# Patient Record
Sex: FEMALE | Race: WHITE | NOT HISPANIC OR LATINO | Employment: STUDENT | URBAN - NONMETROPOLITAN AREA
[De-identification: names, ages, dates, MRNs, and addresses within clinical notes are randomized per-mention and may not be internally consistent; named-entity substitution may affect disease eponyms.]

---

## 2019-12-26 ENCOUNTER — OFFICE VISIT (OUTPATIENT)
Dept: URGENT CARE | Facility: PHYSICIAN GROUP | Age: 29
End: 2019-12-26
Payer: COMMERCIAL

## 2019-12-26 VITALS
DIASTOLIC BLOOD PRESSURE: 78 MMHG | HEIGHT: 65 IN | BODY MASS INDEX: 32.49 KG/M2 | HEART RATE: 100 BPM | WEIGHT: 195 LBS | TEMPERATURE: 97.7 F | SYSTOLIC BLOOD PRESSURE: 116 MMHG | OXYGEN SATURATION: 98 % | RESPIRATION RATE: 16 BRPM

## 2019-12-26 DIAGNOSIS — G43.909 MIGRAINE SYNDROME: ICD-10-CM

## 2019-12-26 DIAGNOSIS — R20.2 FACIAL PARESTHESIA: ICD-10-CM

## 2019-12-26 PROCEDURE — 99204 OFFICE O/P NEW MOD 45 MIN: CPT | Performed by: PHYSICIAN ASSISTANT

## 2019-12-26 RX ORDER — METHYLPREDNISOLONE 4 MG/1
4 TABLET ORAL SEE ADMIN INSTRUCTIONS
Qty: 21 TAB | Refills: 0 | Status: SHIPPED | OUTPATIENT
Start: 2019-12-26

## 2019-12-26 RX ORDER — SUMATRIPTAN 50 MG/1
50 TABLET, FILM COATED ORAL
Qty: 10 TAB | Refills: 0 | Status: SHIPPED | OUTPATIENT
Start: 2019-12-26

## 2019-12-26 NOTE — PROGRESS NOTES
Chief Complaint   Patient presents with   • Numbness     R side of face X 8 days       HISTORY OF PRESENT ILLNESS: Patient is a 29 y.o. female who presents today because she has reduced sensation to the right side of her face.  This been going on for 8 days.  She denies any trauma to the area, denies any headaches, denies any visual disturbances or changes.  She has not been taking any medications for her current symptoms.  She does have a history of migraines, has not had her migraine medication in quite some time, but this is not her typical symptomology.  Denies any drooping or inability to move her face, has had normal speech    There are no active problems to display for this patient.      Allergies:Patient has no known allergies.    Current Outpatient Medications Ordered in Epic   Medication Sig Dispense Refill   • SUMAtriptan (IMITREX) 50 MG Tab Take 1 Tab by mouth Once PRN for Migraine for up to 1 dose. 10 Tab 0   • methylPREDNISolone (MEDROL DOSEPAK) 4 MG Tablet Therapy Pack Take 1 Tab by mouth See Admin Instructions. 21 Tab 0     No current Epic-ordered facility-administered medications on file.        History reviewed. No pertinent past medical history.    Social History     Tobacco Use   • Smoking status: Current Every Day Smoker     Packs/day: 0.00     Types: Cigarettes   • Smokeless tobacco: Never Used   Substance Use Topics   • Alcohol use: Not on file   • Drug use: Not on file       No family status information on file.   History reviewed. No pertinent family history.    ROS:  Review of Systems   Constitutional: Negative for fever, chills, weight loss and malaise/fatigue.   HENT: Negative for ear pain, nosebleeds, congestion, sore throat and neck pain.    Eyes: Negative for blurred vision.   Respiratory: Negative for cough, sputum production, shortness of breath and wheezing.    Cardiovascular: Negative for chest pain, palpitations, orthopnea and leg swelling.   Gastrointestinal: Negative for  "heartburn, nausea, vomiting and abdominal pain.   Genitourinary: Negative for dysuria, urgency and frequency.     Exam:  /78 (BP Location: Right arm, Patient Position: Sitting, BP Cuff Size: Adult)   Pulse 100   Temp 36.5 °C (97.7 °F) (Temporal)   Resp 16   Ht 1.651 m (5' 5\")   Wt 88.5 kg (195 lb)   SpO2 98%   General:  Well nourished, well developed female in NAD  Head:Normocephalic, atraumatic  Eyes: PERRLA, EOM within normal limits, no conjunctival injection, no scleral icterus, visual fields and acuity grossly intact.  Ears: Normal shape and symmetry, no tenderness, no discharge. External canals are without any significant edema or erythema. Tympanic membranes are without any inflammation, no effusion. Gross auditory acuity is intact  Nose: Symmetrical without tenderness, no discharge.  Mouth: reasonable hygiene, no erythema exudates or tonsillar enlargement.  Neck: no masses, range of motion within normal limits, no tracheal deviation. No obvious thyroid enlargement.  Pulmonary: chest is symmetrical with respiration, no wheezes, crackles, or rhonchi.  Cardiovascular: regular rate and rhythm without murmurs, rubs, or gallops.  Extremities: no clubbing, cyanosis, or edema.  Neurologic: Alert and oriented x3, normal gait, normal upper and lower extremity strength, cranial nerves II through XII checked and are grossly intact with the exception of somewhat reduced sensation to the right side of her forehead, cheek and chin in a trigeminal distribution    Please note that this dictation was created using voice recognition software. I have made every reasonable attempt to correct obvious errors, but I expect that there are errors of grammar and possibly content that I did not discover before finalizing the note.    Assessment/Plan:  1. Migraine syndrome  SUMAtriptan (IMITREX) 50 MG Tab   2. Facial paresthesia  methylPREDNISolone (MEDROL DOSEPAK) 4 MG Tablet Therapy Pack   Unclear etiology but if symptoms " do not significantly improve on the above medications, recommended that she go to the ER.  Also go to the ER for any worsening of symptoms    Followup with primary care in the next 7-10 days if not significantly improving, return to the urgent care or go to the emergency room sooner for any worsening of symptoms.

## 2024-06-19 ENCOUNTER — OFFICE VISIT (OUTPATIENT)
Dept: MEDICAL GROUP | Facility: MEDICAL CENTER | Age: 34
End: 2024-06-19
Payer: COMMERCIAL

## 2024-06-19 ENCOUNTER — TELEPHONE (OUTPATIENT)
Dept: MEDICAL GROUP | Facility: MEDICAL CENTER | Age: 34
End: 2024-06-19

## 2024-06-19 VITALS
HEART RATE: 119 BPM | BODY MASS INDEX: 27.66 KG/M2 | HEIGHT: 65 IN | TEMPERATURE: 98.4 F | OXYGEN SATURATION: 97 % | WEIGHT: 166 LBS | DIASTOLIC BLOOD PRESSURE: 70 MMHG | SYSTOLIC BLOOD PRESSURE: 118 MMHG

## 2024-06-19 DIAGNOSIS — Z11.4 ENCOUNTER FOR SCREENING FOR HIV: ICD-10-CM

## 2024-06-19 DIAGNOSIS — F43.81 PROLONGED GRIEF DISORDER: ICD-10-CM

## 2024-06-19 DIAGNOSIS — E66.3 OVERWEIGHT (BMI 25.0-29.9): ICD-10-CM

## 2024-06-19 DIAGNOSIS — Z11.59 NEED FOR HEPATITIS C SCREENING TEST: ICD-10-CM

## 2024-06-19 DIAGNOSIS — F32.2 SEVERE MAJOR DEPRESSION (HCC): ICD-10-CM

## 2024-06-19 DIAGNOSIS — F11.90 OPIOID USE DISORDER: ICD-10-CM

## 2024-06-19 DIAGNOSIS — Z13.6 SCREENING FOR CARDIOVASCULAR CONDITION: ICD-10-CM

## 2024-06-19 PROCEDURE — 3078F DIAST BP <80 MM HG: CPT | Performed by: FAMILY MEDICINE

## 2024-06-19 PROCEDURE — 3074F SYST BP LT 130 MM HG: CPT | Performed by: FAMILY MEDICINE

## 2024-06-19 PROCEDURE — 99204 OFFICE O/P NEW MOD 45 MIN: CPT | Performed by: FAMILY MEDICINE

## 2024-06-19 RX ORDER — BUPRENORPHINE HYDROCHLORIDE AND NALOXONE HYDROCHLORIDE DIHYDRATE 8; 2 MG/1; MG/1
1 TABLET SUBLINGUAL 3 TIMES DAILY
Qty: 90 TABLET | Refills: 0 | Status: SHIPPED | OUTPATIENT
Start: 2024-06-19 | End: 2024-06-19

## 2024-06-19 RX ORDER — ARIPIPRAZOLE 5 MG/1
5 TABLET ORAL DAILY
COMMUNITY
End: 2024-06-19 | Stop reason: SDUPTHER

## 2024-06-19 RX ORDER — NALOXONE HYDROCHLORIDE 4 MG/.1ML
1 SPRAY NASAL PRN
COMMUNITY
Start: 2024-04-04 | End: 2024-06-19 | Stop reason: SDUPTHER

## 2024-06-19 RX ORDER — FLUOXETINE HYDROCHLORIDE 20 MG/1
20 CAPSULE ORAL DAILY
Qty: 90 CAPSULE | Refills: 3 | Status: SHIPPED | OUTPATIENT
Start: 2024-06-19

## 2024-06-19 RX ORDER — BUPRENORPHINE AND NALOXONE 8; 2 MG/1; MG/1
1 FILM, SOLUBLE BUCCAL; SUBLINGUAL 3 TIMES DAILY
Qty: 90 EACH | Refills: 0 | Status: SHIPPED | OUTPATIENT
Start: 2024-08-18 | End: 2024-06-19

## 2024-06-19 RX ORDER — ARIPIPRAZOLE 5 MG/1
5 TABLET ORAL DAILY
Qty: 90 TABLET | Refills: 3 | Status: SHIPPED | OUTPATIENT
Start: 2024-06-19

## 2024-06-19 RX ORDER — BUPRENORPHINE HYDROCHLORIDE AND NALOXONE HYDROCHLORIDE DIHYDRATE 8; 2 MG/1; MG/1
1 TABLET SUBLINGUAL 3 TIMES DAILY
COMMUNITY
End: 2024-06-19 | Stop reason: SDUPTHER

## 2024-06-19 RX ORDER — NALOXONE HYDROCHLORIDE 4 MG/.1ML
1 SPRAY NASAL PRN
Qty: 2 EACH | Refills: 3 | Status: SHIPPED | OUTPATIENT
Start: 2024-06-19

## 2024-06-19 RX ORDER — BUPRENORPHINE AND NALOXONE 8; 2 MG/1; MG/1
1 FILM, SOLUBLE BUCCAL; SUBLINGUAL 3 TIMES DAILY
Qty: 90 EACH | Refills: 0 | Status: SHIPPED | OUTPATIENT
Start: 2024-07-19 | End: 2024-06-19

## 2024-06-19 ASSESSMENT — PATIENT HEALTH QUESTIONNAIRE - PHQ9
5. POOR APPETITE OR OVEREATING: 3 - NEARLY EVERY DAY
CLINICAL INTERPRETATION OF PHQ2 SCORE: 6
SUM OF ALL RESPONSES TO PHQ QUESTIONS 1-9: 23

## 2024-06-19 NOTE — TELEPHONE ENCOUNTER
----- Message from Guanakito Birmingham D.O. sent at 6/19/2024  3:03 PM PDT -----  Regarding: Suboxone  Will you please call the pharmacy and tell them to cancel the Suboxone.  The other medications are fine.  Thank you

## 2024-06-19 NOTE — PROGRESS NOTES
Verbal consent was acquired by the patient to use JOVAN copilot ambient listening note generation during this visit    Subjective:     CC:  Diagnoses of Opioid use disorder, Severe major depression (HCC), Prolonged grief disorder, Screening for cardiovascular condition, Overweight (BMI 25.0-29.9), Need for hepatitis C screening test, and Encounter for screening for HIV were pertinent to this visit.    HISTORY OF THE PRESENT ILLNESS: Patient is a 33 y.o. female. This pleasant patient is here today to establish care and discuss location management.     History of Present Illness  The patient is a 33-year-old female who is coming in to establish care.    The patient recently returned home from inpatient treatment at [facility] in [place], where she was prescribed Prozac 20 mg, Abilify 5 mg, and Suboxone, administered thrice daily. She reports an improvement in her condition, however, she has been without Abilify for the past 2 weeks. She expresses feelings of malaise and expresses a desire to reside independently. Her outpatient treatment was initiated due to a heroin addiction. Her father, who was 63 years old, was diagnosed with lung cancer and passed away 3 weeks later. Six months later, her dog of 16 years passed away. Two weeks later, her maternal aunt passed away. She was diagnosed with ADHD and subsequently developed prolonged grief disorder. She resides in [facility] with her , and her mother who are aware of her heroin addiction. Her father was an alcoholic, but her  and mother do not have any addictions. She does not have any leftover pain medications or other stashes. The exit strategy from rehab was not ideal for her. She denies any self-harming thoughts, but expresses concern about potential harm to others who care for her as a manifestation of guilt. She was recommended for rehabilitation, but found it challenging to attend. She felt better on Prozac and Abilify, although she did not  consistently feel happy. She had a telehealth consultation with a psychologist who recommended primary care. She sees a psychologist weekly via telehealth he is based in [place]. She promises not to harm herself. She rarely takes Seroquel. She was prescribed Ativan during her detoxification period. She was also prescribed baclofen, which she did not take. She has Narcan at home. She does not have any minors in her home.    The patient requests her hormone levels to be checked. She has an intrauterine device (IUD) and is unsure of her menstrual cycle. Her last Pap smear was 7 years ago. She had blood work done during her inpatient treatment.   She went through a separation with her .   Her maternal aunt had rheumatoid arthritis and thyroid issues. Her mother does not have any medical problems.    Current Outpatient Medications Ordered in Epic   Medication Sig Dispense Refill    FLUoxetine (PROZAC) 20 MG Cap Take 1 Capsule by mouth every day. 90 Capsule 3    ARIPiprazole (ABILIFY) 5 MG tablet Take 1 Tablet by mouth every day. 90 Tablet 3    Naloxone (NARCAN) 4 MG/0.1ML Liquid Administer 4 mg into affected nostril(S) as needed (over dose). 2 Each 3     No current Epic-ordered facility-administered medications on file.       Allergies   Allergen Reactions    Tape     Vicodin [Hydrocodone-Acetaminophen]        Past Medical History:   Diagnosis Date    Chronic low back pain     Seizure disorder (HCC)        Past Surgical History:   Procedure Laterality Date    I&D  2/2009    Rt breast abcess       Family History   Problem Relation Age of Onset    Alcohol abuse Father     Cancer Father         lung cancer, smoker    Lung Disease Father     Thyroid Maternal Aunt        Social History     Socioeconomic History    Marital status:    Tobacco Use    Smoking status: Every Day     Types: Cigarettes    Smokeless tobacco: Never    Tobacco comments:     4/day   Substance and Sexual Activity    Alcohol use: No      "Comment: OCCAsIONALLY    Drug use: No   Social History Narrative    ** Merged History Encounter **            Health Maintenance: Began the discussion but much work to do    ROS:   ROS see HPI      Objective:     Exam: /70   Pulse (!) 119   Temp 36.9 °C (98.4 °F) (Temporal)   Ht 1.651 m (5' 5\")   Wt 75.3 kg (166 lb)   SpO2 97%  Body mass index is 27.62 kg/m².    Physical Exam  Vitals reviewed.   Constitutional:       General: She is in acute distress.      Appearance: She is not ill-appearing or toxic-appearing.   HENT:      Head: Normocephalic and atraumatic.   Cardiovascular:      Rate and Rhythm: Regular rhythm. Tachycardia present.      Heart sounds: Normal heart sounds.   Pulmonary:      Effort: Pulmonary effort is normal. No respiratory distress.      Breath sounds: Normal breath sounds.   Skin:     General: Skin is warm and dry.   Neurological:      Mental Status: She is alert.      Gait: Gait normal.   Psychiatric:      Comments: Tearful at times       Depression Screening    Little interest or pleasure in doing things?  3 - nearly every day  Feeling down, depressed , or hopeless? 3 - nearly every day  Trouble falling or staying asleep, or sleeping too much?  2 - more than half the days  Feeling tired or having little energy?  3 - nearly every day  Poor appetite or overeating?  3 - nearly every day  Feeling bad about yourself - or that you are a failure or have let yourself or your family down? 3 - nearly every day  Trouble concentrating on things, such as reading the newspaper or watching television? 3 - nearly every day  Moving or speaking so slowly that other people could have noticed.  Or the opposite - being so fidgety or restless that you have been moving around a lot more than usual?  0 - not at all  Thoughts that you would be better off dead, or of hurting yourself?  3 - nearly every day  Patient Health Questionnaire Score: 23      If depressive symptoms identified deferred to follow up " visit unless specifically addressed in assesment and plan.    Interpretation of PHQ-9 Total Score   Score Severity   1-4 No Depression   5-9 Mild Depression   10-14 Moderate Depression   15-19 Moderately Severe Depression   20-27 Severe Depression        Assessment & Plan:   33 y.o. female with the following -    1. Opioid use disorder  - Naloxone (NARCAN) 4 MG/0.1ML Liquid; Administer 4 mg into affected nostril(S) as needed (over dose).  Dispense: 2 Each; Refill: 3  - URINE DRUG SCREEN; Future  - Consent for Opiate Prescription  - Referral to Behavioral Health    2. Severe major depression (HCC)  - FLUoxetine (PROZAC) 20 MG Cap; Take 1 Capsule by mouth every day.  Dispense: 90 Capsule; Refill: 3  - ARIPiprazole (ABILIFY) 5 MG tablet; Take 1 Tablet by mouth every day.  Dispense: 90 Tablet; Refill: 3  - CBC WITH DIFFERENTIAL; Future  - Comp Metabolic Panel; Future  - Lipid Profile; Future  - TSH WITH REFLEX TO FT4; Future  - FSH/LH; Future  - ESTRADIOL; Future    3. Prolonged grief disorder  - FLUoxetine (PROZAC) 20 MG Cap; Take 1 Capsule by mouth every day.  Dispense: 90 Capsule; Refill: 3  - ARIPiprazole (ABILIFY) 5 MG tablet; Take 1 Tablet by mouth every day.  Dispense: 90 Tablet; Refill: 3  - CBC WITH DIFFERENTIAL; Future  - Comp Metabolic Panel; Future  - Lipid Profile; Future  - TSH WITH REFLEX TO FT4; Future  - FSH/LH; Future  - ESTRADIOL; Future    4. Screening for cardiovascular condition  - Comp Metabolic Panel; Future  - Lipid Profile; Future    5. Overweight (BMI 25.0-29.9)  - Comp Metabolic Panel; Future  - Lipid Profile; Future  - TSH WITH REFLEX TO FT4; Future    6. Need for hepatitis C screening test  - HEP C VIRUS ANTIBODY; Future    7. Encounter for screening for HIV  - HIV AG/AB COMBO ASSAY SCREENING; Future      Assessment & Plan  The patient is a 33-year-old female presenting for the first time in the clinic and has not had primary care in some time due to insurance issues. She presents with a  "story of being in an acute rehab facility in [place], [place], and is now back home in [facility] and here in [place] to establish care with me. She reports that she had a couple of weeks of her medicine prescribed to her, but the timeline seems a little shaky because she has been out of her medicine, but today is 2 weeks, but she clearly is in emotional distress for this.    1. Opioid use disorder.  The patient reports a history of heroin addiction following significant life stressors, including deaths of her father, her \" \" child \", her dog of 16 years, and her aunt in succession. She was prescribed Suboxone 8-2 mg films thrice daily at her current dosage. We prepared to prescribe this to her and refer her to our behavioral health addiction specialist, Dr. Jade. She signed the consent.  Reviewed the the PDMP. she left without providing a UDS, which we made quite clear the UDS was expected. Consequently, her Suboxone prescriptions were cancelled. Although she may indeed need them, we need to build trust and it goes both ways. I am certainly open to seeing her again if she chooses, but we will need to do this a little better next time on her end.    2. Severe major depression.  The patient's PHQ-9 score was 23, which is significantly elevated. She denies suicidal ideation, but reports feelings of guilt and feeling as though people be better off without her. She was previously on Abilify 5 mg and Prozac 20 mg. I certainly do not think discontinuing these abruptly is good for her. I have sent in refills and plan for a shorter follow-up. She should continue to speak with her counselor once a week as she has been. We will expand our referral base if needed, but I am hoping if she is back on her regular medicine, she will be feeling more stable next time I see her. Safety planning was provided, including being able to visit the ER if she does not feel safe any longer.    3. Prolonged grief disorder.  For the plan, I " would refer her.    4. Health maintenance.  The patient has not had any labs in our system in general. We will order appropriate lab work for her. I briefly touched on that. She is due for a Pap smear, as she reports it has been 7 years. I did not get a chance to discuss with her vaccines, but I did not seem appropriate at today's appointment. I do hope that she follows up and if not with me with someone else, and hopefully she is feeling a bit better and able to move forward with us, but it certainly is high risk.        Return in about 4 weeks (around 7/17/2024), or if symptoms worsen or fail to improve, for Controlled Substance, Medication F/U, Lab F/U.    Please note that this dictation was created using voice recognition software. I have made every reasonable attempt to correct obvious errors, but I expect that there are errors of grammar and possibly content that I did not discover before finalizing the note.

## 2024-06-20 ENCOUNTER — TELEPHONE (OUTPATIENT)
Dept: MEDICAL GROUP | Facility: MEDICAL CENTER | Age: 34
End: 2024-06-20
Payer: COMMERCIAL

## 2024-08-01 ENCOUNTER — APPOINTMENT (OUTPATIENT)
Dept: BEHAVIORAL HEALTH | Facility: MEDICAL CENTER | Age: 34
End: 2024-08-01
Attending: FAMILY MEDICINE
Payer: COMMERCIAL

## 2024-08-20 ENCOUNTER — APPOINTMENT (OUTPATIENT)
Dept: BEHAVIORAL HEALTH | Facility: CLINIC | Age: 34
End: 2024-08-20
Payer: COMMERCIAL

## 2024-09-13 ENCOUNTER — HOSPITAL ENCOUNTER (EMERGENCY)
Facility: MEDICAL CENTER | Age: 34
End: 2024-09-13
Attending: EMERGENCY MEDICINE
Payer: COMMERCIAL

## 2024-09-13 VITALS
SYSTOLIC BLOOD PRESSURE: 130 MMHG | HEIGHT: 65 IN | RESPIRATION RATE: 18 BRPM | OXYGEN SATURATION: 95 % | TEMPERATURE: 97.6 F | DIASTOLIC BLOOD PRESSURE: 94 MMHG | HEART RATE: 99 BPM | BODY MASS INDEX: 28.03 KG/M2 | WEIGHT: 168.21 LBS

## 2024-09-13 DIAGNOSIS — S61.215A LACERATION OF LEFT RING FINGER WITHOUT FOREIGN BODY WITHOUT DAMAGE TO NAIL, INITIAL ENCOUNTER: ICD-10-CM

## 2024-09-13 PROCEDURE — 90715 TDAP VACCINE 7 YRS/> IM: CPT | Performed by: EMERGENCY MEDICINE

## 2024-09-13 PROCEDURE — 90471 IMMUNIZATION ADMIN: CPT

## 2024-09-13 PROCEDURE — 700111 HCHG RX REV CODE 636 W/ 250 OVERRIDE (IP): Performed by: EMERGENCY MEDICINE

## 2024-09-13 PROCEDURE — 303747 HCHG EXTRA SUTURE

## 2024-09-13 PROCEDURE — 99282 EMERGENCY DEPT VISIT SF MDM: CPT

## 2024-09-13 PROCEDURE — 700101 HCHG RX REV CODE 250: Performed by: EMERGENCY MEDICINE

## 2024-09-13 PROCEDURE — 304999 HCHG REPAIR-SIMPLE/INTERMED LEVEL 1

## 2024-09-13 PROCEDURE — 304217 HCHG IRRIGATION SYSTEM

## 2024-09-13 RX ADMIN — CLOSTRIDIUM TETANI TOXOID ANTIGEN (FORMALDEHYDE INACTIVATED), CORYNEBACTERIUM DIPHTHERIAE TOXOID ANTIGEN (FORMALDEHYDE INACTIVATED), BORDETELLA PERTUSSIS TOXOID ANTIGEN (GLUTARALDEHYDE INACTIVATED), BORDETELLA PERTUSSIS FILAMENTOUS HEMAGGLUTININ ANTIGEN (FORMALDEHYDE INACTIVATED), BORDETELLA PERTUSSIS PERTACTIN ANTIGEN, AND BORDETELLA PERTUSSIS FIMBRIAE 2/3 ANTIGEN 0.5 ML: 5; 2; 2.5; 5; 3; 5 INJECTION, SUSPENSION INTRAMUSCULAR at 21:18

## 2024-09-13 RX ADMIN — LIDOCAINE HYDROCHLORIDE 5 ML: 10 INJECTION, SOLUTION INFILTRATION; PERINEURAL at 21:00

## 2024-09-13 ASSESSMENT — PAIN DESCRIPTION - PAIN TYPE: TYPE: ACUTE PAIN

## 2024-09-14 NOTE — ED NOTES
Patient given discharge instructions. Patient given education on laceration. Patient instructed to follow up with PCP. Patient provided education to come to ER if symptoms worsen or 10 days later for wound check. Discharged in stable condition, able to walk out with steady gait.

## 2024-09-14 NOTE — ED TRIAGE NOTES
"Chief Complaint   Patient presents with    Laceration     L hand 4th finger. Cut with a razor on accident when working \"on the center [place] in my car\".      Physical Exam  Pulmonary:      Effort: Pulmonary effort is normal.   Skin:     General: Skin is warm and dry.   Neurological:      Mental Status: She is alert.         "

## 2024-09-14 NOTE — ED PROVIDER NOTES
"ED Provider Note    CHIEF COMPLAINT  Chief Complaint   Patient presents with    Laceration     L hand 4th finger. Cut with a razor on accident when working \"on the [place] in my car\".        EXTERNAL RECORDS REVIEWED  Outpatient Notes patient was seen at [facility] 6/19/2024 with a diagnosis of depression opioid use disorder low prolonged grief and elevated BMI she is taking Suboxone    HPI/ROS  LIMITATION TO HISTORY   Select: : None  OUTSIDE HISTORIAN(S):  none    Kathryn Lozano is a 34 y.o. female who presents complaining of a laceration to the dorsal aspect of her left fourth finger on the PIP joint.  She was working on the center console of her car and opened a brand-new razor blade and accidentally sliced her finger.  Since it was on the knuckle she decided to come in because it would keep reopening up.  She denies any inability to extend her finger completely she denies any numbness or tingling.  She is not diabetic.  She cannot remember when her last tetanus shot was.    PAST MEDICAL HISTORY   has a past medical history of Chronic low back pain and Seizure disorder (HCC).    SURGICAL HISTORY   has a past surgical history that includes i&d (2/2009).    FAMILY HISTORY  Family History   Problem Relation Age of Onset    Alcohol abuse Father     Cancer Father         lung cancer, smoker    Lung Disease Father     Thyroid Maternal Aunt        SOCIAL HISTORY  Social History     Tobacco Use    Smoking status: Every Day     Types: Cigarettes    Smokeless tobacco: Never    Tobacco comments:     4/day   Substance and Sexual Activity    Alcohol use: No     Comment: OCCAsIONALLY    Drug use: No    Sexual activity: Not on file       CURRENT MEDICATIONS  Home Medications       Reviewed by Ida Davis R.N. (Registered Nurse) on 09/13/24 at 2000  Med List Status: Not Addressed     Medication Last Dose Status   ARIPiprazole (ABILIFY) 5 MG tablet  Active   FLUoxetine (PROZAC) 20 MG Cap  Active " "  Naloxone (NARCAN) 4 MG/0.1ML Liquid  Active                    ALLERGIES  Allergies   Allergen Reactions    Tape     Vicodin [Hydrocodone-Acetaminophen]        PHYSICAL EXAM  VITAL SIGNS: BP (!) 140/96   Pulse 96   Temp 36.4 °C (97.6 °F) (Temporal)   Resp 18   Ht 1.651 m (5' 5\")   Wt 76.3 kg (168 lb 3.4 oz)   LMP  (LMP Unknown) Comment: IUD  SpO2 96%   BMI 27.99 kg/m²      Constitutional: No distress  Skin: 2.5 cm laceration over the PIP joint of her dorsal left 4th finger  Musculoskeletal: Normal strength and sensation of the finger and hand laceration as above distally she is neurovascular tact with normal tendon function normal strength normal sensation and normal cap refill  Vascular: warm to touch good capillary refill   Neurologic: distally neurovascularly intact  Psychiatric: Affect normal      EKG/LABS  none  I have independently interpreted this EKG    RADIOLOGY/PROCEDURES   I have independently interpreted the diagnostic imaging associated with this visit and am waiting the final reading from the radiologist.   My preliminary interpretation is as follows: none    Radiologist interpretation:  No orders to display       COURSE & MEDICAL DECISION MAKING    ASSESSMENT, COURSE AND PLAN  Care Narrative: Kathryn Lozano is a 34 y.o. female who presents complaining of a laceration to the dorsal aspect of her left fourth finger on the PIP joint.  She was working on the center console of her car and opened a brand-new razor blade and accidentally sliced her finger.  Since it was on the knuckle she decided to come in because it would keep reopening up.  She denies any inability to extend her finger completely she denies any numbness or tingling.  She is not diabetic.  She cannot remember when her last tetanus shot was.  On physical exam she is alert awake in no acute distress she has a 2.5 cm laceration across the PIP joint on the dorsal aspect of her left fourth finger bleeding is controlled " distally she has normal cap refill normal sensation and she has normal tendon function              ADDITIONAL PROBLEMS MANAGED      DISPOSITION AND DISCUSSIONS    Laceration Repair Procedure Note    Indication: Laceration    Procedure: The patient was placed in the appropriate position and anesthesia around the laceration was obtained with a full digital block of the left ring finger using 1% Lidocaine without epinephrine. The wound was minimally contaminated .The area was then cleansed with betadine and draped in a sterile fashion and irrigated with normal saline. The laceration was closed with 4-0 Ethilon using interrupted sutures. There were no additional lacerations requiring repair. The wound area was then dressed with a sterile dressing.      Total repaired wound length: 2.5 cm.     Other Items: Suture count: 3    The patient tolerated the procedure well.    Complications: None       The patient will be discharged home with antibiotic ointment and a dressing.  Her tetanus was updated.  I advised her to return the emergency department for any signs of infection otherwise she should keep it clean and dry and apply antibiotic ointment and have the sutures removed in 10 days.  Patient understands her discharge instructions we discharged home in stable improved condition.    I have discussed management of the patient with the following physicians and RODRICK's: None    Discussion of management with other QHP or appropriate source(s): None     Escalation of care considered, and ultimately not performed:none    Barriers to care at this time, including but not limited to:  none.     Decision tools and prescription drugs considered including, but not limited to:  none.      The patient will return for new or worsening symptoms and is stable at the time of discharge.    The patient is referred to a primary physician for blood pressure management, diabetic screening, and for all other preventative health  concerns.        DISPOSITION:  Patient will be discharged home in stable condition.    FOLLOW UP:  Guanakito Birmingham D.O.  [address]  [address]  [place] [id number] [phone]    In 10 days  For wound re-check, For suture removal    [facility], Emergency Dept  [address]  [place] [id number] [phone]    As needed, If symptoms worsen      OUTPATIENT MEDICATIONS:  New Prescriptions    No medications on file   Over-the-counter Tylenol and Motrin as needed    FINAL DIAGNOSIS  1. Laceration of left ring finger without foreign body without damage to nail, initial encounter         Electronically signed by: Sumaya Mc M.D., 9/13/2024 8:13 PM

## 2024-09-19 ENCOUNTER — OFFICE VISIT (OUTPATIENT)
Dept: URGENT CARE | Facility: PHYSICIAN GROUP | Age: 34
End: 2024-09-19
Payer: COMMERCIAL

## 2024-09-19 VITALS
HEART RATE: 94 BPM | WEIGHT: 168 LBS | OXYGEN SATURATION: 97 % | DIASTOLIC BLOOD PRESSURE: 80 MMHG | RESPIRATION RATE: 16 BRPM | SYSTOLIC BLOOD PRESSURE: 102 MMHG | HEIGHT: 65 IN | TEMPERATURE: 98.2 F | BODY MASS INDEX: 27.99 KG/M2

## 2024-09-19 DIAGNOSIS — S61.215A LACERATION OF LEFT RING FINGER WITHOUT FOREIGN BODY WITHOUT DAMAGE TO NAIL, INITIAL ENCOUNTER: ICD-10-CM

## 2024-09-19 PROCEDURE — 3074F SYST BP LT 130 MM HG: CPT | Performed by: NURSE PRACTITIONER

## 2024-09-19 PROCEDURE — 12001 RPR S/N/AX/GEN/TRNK 2.5CM/<: CPT | Mod: F3 | Performed by: NURSE PRACTITIONER

## 2024-09-19 PROCEDURE — 3079F DIAST BP 80-89 MM HG: CPT | Performed by: NURSE PRACTITIONER

## 2024-09-19 NOTE — PROGRESS NOTES
"Subjective:   Kathryn Lozano is a 34 y.o. female who presents for Laceration (Left ring finger tip scissors. Happened this morning 8:30 jenna)    Patient presents clinic today with laceration to palmar aspect of left ring finger in mid finger.  Patient does have full flexion extension of her finger.  She states that it happened about 830 this morning with a pair of scissors trying to cut a zip tie..  Patient does have healed previous laceration with 2 sutures in place over dorsum side of hand at the PIP joint.  Patient denies any inability to extend or flex her finger and denies any numbness or tingling.  Patient is not diabetic.  Patient did receive last tetanus on 9/13/2024 due to laceration over PIP joint.  Patient does states there used to be 3 sutures there however 1 fell out.  Patient has been placing superglue over it to keep it closed.    Medications, Allergies, and current problem list reviewed today in Epic.     Objective:     /80   Pulse 94   Temp 36.8 °C (98.2 °F) (Temporal)   Resp 16   Ht 1.651 m (5' 5\")   Wt 76.2 kg (168 lb)   SpO2 97%     Physical Exam  Vitals reviewed.   Constitutional:       Appearance: Normal appearance.   HENT:      Head: Normocephalic.      Nose: Nose normal.      Mouth/Throat:      Mouth: Mucous membranes are moist.   Eyes:      Extraocular Movements: Extraocular movements intact.      Conjunctiva/sclera: Conjunctivae normal.      Pupils: Pupils are equal, round, and reactive to light.   Cardiovascular:      Rate and Rhythm: Normal rate.   Pulmonary:      Effort: Pulmonary effort is normal.   Musculoskeletal:         General: Normal range of motion.      Cervical back: Normal range of motion and neck supple.   Skin:     General: Skin is warm and dry.      Comments: Left fourth finger: 2.5 cm crescent laceration on palmar aspect between PIP and DIP joint.  Distally neurovascular intact.  Full flexion extension.  CRT less than 2 seconds.   Neurological:      " Mental Status: She is alert and oriented to person, place, and time.   Psychiatric:         Mood and Affect: Mood normal.         Behavior: Behavior normal.         Thought Content: Thought content normal.         Judgment: Judgment normal.         Assessment/Plan:     Diagnosis and associated orders:     1. Laceration of left ring finger without foreign body without damage to nail, initial encounter           Comments/MDM:     PROCEDURE:    Indication: Left fourth finger 2.5 cm laceration.     Note: Wound was irrigated with sterile water. No foreign body seen. 1% lidocaine without epinehrine injected digital block, area was fully anesthetized.  The wound was minimally contaminated.  Area was thoroughly cleansed with Betadine, draped in sterile fashion.  A total of 3, 5-0 nylon sutures were placed in interrupted fashion. Patient tolerated procedure well without any complications.    Wound was dressed with topical antibiotic ointment and bandage. Advised to leave bandage on for 24 hours. Follow-up in 7-10 days for recheck and suture removal.    Patient was involved with shared decision-making throughout the exam today and verbalizes understanding regards to plan of care, discharge instructions, and follow-up         History  Mechanism  Exact Location of Pain, Characterization  Dominance of Hand  Injury/surgery to the area previously   Chronic medical conditions, medications, allergies   Associated Symptoms     PLUS…  Occupation (Description of Job Duties)    Hobbies     Physical exam  Affected area: Inspection, Palpation, ROM, Special Tests  Neurovascular status  Other side comparison  Skin integrity  Effusion, ecchymosis, edema  JAB - Joint above and below    Neurovascular Exam:  Distal pulses  Capillary refill assessment and measurement  Motor, sensory, 2pt descrimination testing distal to injury    Hand Exam: Skin intact; No soft tissue swelling; No tender 1MC; No tender 2MC; No tender 3MC; No tender 4MC; No  tender 5MC; No bony deformity. Sensation normal; Motor normal; Flexion normal; extension normal; capillary refill <2 sec. able to fully flex and extend all fingers well actively with resistance.    Fight bite = ER (treat as open fracture; high likelihood of joint or bone infection)           Differential diagnosis, natural history, supportive care, and indications for immediate follow-up discussed.    Advised the patient to follow-up with the primary care physician for recheck, reevaluation, and consideration of further management.    I personally reviewed prior external notes and test results pertinent to today's visit as well as additional imaging and testing completed in clinic today.     Please note that this dictation was created using voice recognition software. I have made a reasonable attempt to correct obvious errors, but I expect that there are errors of grammar and possibly content that I did not discover before finalizing the note.

## 2024-11-14 ENCOUNTER — OFFICE VISIT (OUTPATIENT)
Dept: OPHTHALMOLOGY | Facility: MEDICAL CENTER | Age: 34
End: 2024-11-14
Payer: COMMERCIAL

## 2024-11-14 DIAGNOSIS — H53.40 VISUAL FIELD DEFECT: ICD-10-CM

## 2024-11-14 DIAGNOSIS — H47.20 OPTIC ATROPHY: ICD-10-CM

## 2024-11-14 PROCEDURE — 99205 OFFICE O/P NEW HI 60 MIN: CPT | Mod: 25 | Performed by: STUDENT IN AN ORGANIZED HEALTH CARE EDUCATION/TRAINING PROGRAM

## 2024-11-14 PROCEDURE — 92250 FUNDUS PHOTOGRAPHY W/I&R: CPT | Performed by: STUDENT IN AN ORGANIZED HEALTH CARE EDUCATION/TRAINING PROGRAM

## 2024-11-14 PROCEDURE — 92083 EXTENDED VISUAL FIELD XM: CPT | Performed by: STUDENT IN AN ORGANIZED HEALTH CARE EDUCATION/TRAINING PROGRAM

## 2024-11-14 ASSESSMENT — VISUAL ACUITY
OS_SC: 20/20
METHOD: SNELLEN - LINEAR
METHOD: SNELLEN - LINEAR
OD_PH_SC: 20/150
OD_SC: 20/400

## 2024-11-14 ASSESSMENT — SLIT LAMP EXAM - LIDS
COMMENTS: NORMAL
COMMENTS: NORMAL

## 2024-11-14 ASSESSMENT — TONOMETRY
OD_IOP_MMHG: 19
OS_IOP_MMHG: 19
IOP_METHOD: ICARE

## 2024-11-14 ASSESSMENT — CUP TO DISC RATIO
OD_RATIO: 0.6
OS_RATIO: 0.6

## 2024-11-14 ASSESSMENT — CONF VISUAL FIELD
OD_INFERIOR_TEMPORAL_RESTRICTION: 0
OD_SUPERIOR_NASAL_RESTRICTION: 0
OS_SUPERIOR_TEMPORAL_RESTRICTION: 0
OD_SUPERIOR_TEMPORAL_RESTRICTION: 0
OS_INFERIOR_TEMPORAL_RESTRICTION: 0
OS_NORMAL: 1
OS_INFERIOR_NASAL_RESTRICTION: 0
OD_INFERIOR_NASAL_RESTRICTION: 0
OD_NORMAL: 1
OS_SUPERIOR_NASAL_RESTRICTION: 0

## 2024-11-14 ASSESSMENT — REFRACTION_MANIFEST
OD_AXIS: 069
OS_CYLINDER: +0.50
OS_SPHERE: -0.50
OD_CYLINDER: +0.75
OD_SPHERE: -0.75
OS_AXIS: 096

## 2024-11-14 ASSESSMENT — EXTERNAL EXAM - LEFT EYE: OS_EXAM: NORMAL

## 2024-11-14 ASSESSMENT — EXTERNAL EXAM - RIGHT EYE: OD_EXAM: NORMAL

## 2024-11-14 NOTE — PROGRESS NOTES
Peds/Neuro Ophthalmology:   Telma Luther M.D.    Date & Time note created:    11/14/2024   5:11 PM     Referring MD / APRN:  Guanakito Birmingham D.O., No att. providers found    Patient ID:  Name:             Kathryn Lozano   YOB: 1990  Age:                 34 y.o.  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 Pt reports superonasal shadow that is grey in appearance. Shadow has not grown or changed in opacity since 10/29.  )      History of Present Illness:      Pietro Lozano is a 33 yo woman referred by HD retina for visual field defect OD    Exam 11/4/24 demonstrated VA 20/350+1 OD 20/20-2 OS, normal optic nerves. Pt had a recent FA with [facility] retina which was reported normal. [facility] additionally reported normal    Pietro has been experiencing blurred vision OD since 10/30/24. She reports a superonasal shadow in the R eye that is constant and does not move positions. She denies any eye pain. Of note she reported being hit in the nose by her pitbull the day prior to vision loss 10/29. She is not sure if the scotoma is improving but she is able to see more light. She denies any symptoms in the left eye. She currently does not wear any prescriptions.     History of migraine headaches  Severity: 10/10  Associated symptoms; nausea, fatigue, light sound sensitivity  Location: top of head  Characteristic: pressure  Duration: all day  Frequency: ranges from twice a month to once every 6 months   This past week she has had two episodes    She has a 5 pack year smoking history. Denies any excessive alcohol use or family history of vision loss        Review of Systems:  ROS    Past Medical History:   Past Medical History:   Diagnosis Date    Chronic low back pain     Seizure disorder (HCC)        Past Surgical  History:  Past Surgical History:   Procedure Laterality Date    I&D  2/2009    Rt breast abcess       Current Outpatient Medications:  Current Outpatient Medications   Medication Sig Dispense Refill    FLUoxetine (PROZAC) 20 MG Cap Take 1 Capsule by mouth every day. 90 Capsule 3    ARIPiprazole (ABILIFY) 5 MG tablet Take 1 Tablet by mouth every day. 90 Tablet 3    Naloxone (NARCAN) 4 MG/0.1ML Liquid Administer 4 mg into affected nostril(S) as needed (over dose). 2 Each 3     No current facility-administered medications for this visit.       Allergies:  Allergies   Allergen Reactions    Tape     Vicodin [Hydrocodone-Acetaminophen]        Family History:  Family History   Problem Relation Age of Onset    Alcohol abuse Father     Cancer Father         lung cancer, smoker    Lung Disease Father     Thyroid Maternal Aunt        Social History:  Social History     Socioeconomic History    Marital status:      Spouse name: Not on file    Number of children: Not on file    Years of education: Not on file    Highest education level: Not on file   Occupational History    Not on file   Tobacco Use    Smoking status: Every Day     Types: Cigarettes    Smokeless tobacco: Never    Tobacco comments:     4/day   Substance and Sexual Activity    Alcohol use: No     Comment: OCCAsIONALLY    Drug use: No    Sexual activity: Not on file   Other Topics Concern    Not on file   Social History Narrative    ** Merged History Encounter **          Social Drivers of Health     Financial Resource Strain: Not on file   Food Insecurity: Not on file   Transportation Needs: Not on file   Physical Activity: Not on file   Stress: Not on file   Social Connections: Not on file   Intimate Partner Violence: Not on file   Housing Stability: Not on file          Physical Exam:  Physical Exam    Oriented x 3  Weight/BMI: There is no height or weight on file to calculate BMI.  There were no vitals taken for this visit.    Base Eye Exam       Visual  Acuity (Snellen - Linear)         Right Left    Dist sc 20/400 20/20    Dist ph sc NI               Visual Acuity #2 (Snellen - Linear)         Right Left    Dist ph sc 20/150               Tonometry (icare, 9:43 AM)         Right Left    Pressure 19 19              Pupils         Dark Light Shape React APD    Right 5 4 Round Brisk None    Left 5 4 Round Brisk None              Visual Fields         Right Left     Full Full              Extraocular Movement         Right Left     Full, Ortho Full, Ortho              Neuro/Psych       Oriented x3: Yes    Mood/Affect: Normal                  Additional Tests       Color         Right Left    Ishihara 6/9 9/9              Stereo       Fly: +    Animals: 0/3    Circles: 0/9                  Slit Lamp and Fundus Exam       External Exam         Right Left    External Normal Normal              Slit Lamp Exam         Right Left    Lids/Lashes Normal Normal    Conjunctiva/Sclera White and quiet White and quiet    Cornea Clear Clear    Anterior Chamber Deep and quiet Deep and quiet    Iris Round and reactive Round and reactive    Lens Clear Clear              Fundus Exam         Right Left    Disc pink, sharp disc margins, flat mild temporal pallor, sharp disc margins    C/D Ratio 0.6 0.6    Macula Normal Normal                  Refraction       Manifest Refraction         Sphere Cylinder Axis    Right -0.75 +0.75 069    Left -0.50 +0.50 096                    Pertinent Lab/Test/Imaging Review:  [facility] wwo : no acute intracranial abnormality     Assessment and Plan:     Visual field defect  33 yo woman who presents with visual field defect OD     Onset 10/30/24 following head trauma. She reports a superonasal shadow in the R eye that is constant and does not move location. She is not sure if it is improving. Denies any eye pain or headaches. No involvement OS. Seen by HD retina where HA reported normal. [facility] normal     Exam 11/14/24: No APD, VA 20/400 OD, 20/20 OS, 6/9 color  plates OD 9/9 OS, generalized depression OD with some sparing of the inferonasal quadrant. EOM full, ortho alignment. R optic nerve appears pink with sharp disc margins. Mild temporal pallor OS. RNFL 100 OD 83 OS. Amsler grid demonstrates multiple paracentral scotomas, the largest of which is in the superonasal grid    Plan:   The R optic nerve appears healthy without evidence of RNFL thinning, however her afferent visual exam is decreased in the R eye. Pt is only 2 weeks out from the incident therefore it is possible she has a mild traumatic optic neuropathy OD that has not resulted in RNFL thinning yet. The lack of APD however is less convincing of an optic neuropathy. Will obtain a MRI orbits wwo and basic autoimmune/infectious work up given the optic nerve pallor noted incidentally in the L eye.     -MRI orbits wwo  -Labs: CNS demyelinating, ESR/CRP, MARVEL, TB, RPR, bartonella, Toxo, B1,B12, folate   -RTC in 4-6 months for repeat fields     Optic atrophy  Temporal pallor OS with corresponding RNFL thinning (83). Pt denies any vision changes or pain OS. Noted incidentally. No localizing HVF defect noted today. Will continue to monitor. MRI orbits wwo and infectious/autoimmune labs pending         Telma Luther M.D.    66 total minutes were spent reviewing imaging, records, examining the patient and documenting.

## 2024-11-14 NOTE — PROCEDURES
OD: pink, sharp disc margins, flat. CD 0.4. temporal margins    OS: sharp disc margins, flat, mild temporal PPA. CD 0.5

## 2024-11-14 NOTE — PROCEDURES
OD: low test reliability, generalized depression with some sparing of the inferotemporal quadrant. VFI 28%, MD -24.53, PSD 7.70    OS: low test reliability, scattered defects. VFI 96%, MD -4.29, PSD 4.72

## 2024-11-15 NOTE — ASSESSMENT & PLAN NOTE
Temporal pallor OS with corresponding RNFL thinning (83). Pt denies any vision changes or pain OS. Noted incidentally. No localizing HVF defect noted today. Will continue to monitor. MRI orbits wwo and infectious/autoimmune labs pending

## 2024-12-07 ENCOUNTER — HOSPITAL ENCOUNTER (OUTPATIENT)
Dept: RADIOLOGY | Facility: MEDICAL CENTER | Age: 34
End: 2024-12-07
Attending: RADIOLOGY
Payer: COMMERCIAL

## 2024-12-07 ENCOUNTER — HOSPITAL ENCOUNTER (OUTPATIENT)
Dept: RADIOLOGY | Facility: MEDICAL CENTER | Age: 34
End: 2024-12-07
Attending: STUDENT IN AN ORGANIZED HEALTH CARE EDUCATION/TRAINING PROGRAM
Payer: COMMERCIAL

## 2024-12-07 DIAGNOSIS — H53.40 VISUAL FIELD DEFECT: ICD-10-CM

## 2024-12-07 PROCEDURE — 700117 HCHG RX CONTRAST REV CODE 255: Mod: JZ

## 2024-12-07 PROCEDURE — 70543 MRI ORBT/FAC/NCK W/O &W/DYE: CPT

## 2024-12-07 PROCEDURE — A9579 GAD-BASE MR CONTRAST NOS,1ML: HCPCS | Mod: JZ

## 2024-12-07 RX ADMIN — GADOTERIDOL 15 ML: 279.3 INJECTION, SOLUTION INTRAVENOUS at 11:03

## 2024-12-09 ENCOUNTER — PATIENT MESSAGE (OUTPATIENT)
Dept: OPHTHALMOLOGY | Facility: MEDICAL CENTER | Age: 34
End: 2024-12-09
Payer: COMMERCIAL

## 2024-12-09 ENCOUNTER — TELEPHONE (OUTPATIENT)
Dept: OPHTHALMOLOGY | Facility: MEDICAL CENTER | Age: 34
End: 2024-12-09
Payer: COMMERCIAL

## 2024-12-09 DIAGNOSIS — H47.20 OPTIC ATROPHY: ICD-10-CM

## 2024-12-09 DIAGNOSIS — H53.40 VISUAL FIELD DEFECT: ICD-10-CM

## 2024-12-09 DIAGNOSIS — R93.0 ABNORMAL MRI OF HEAD: ICD-10-CM

## 2024-12-09 NOTE — TELEPHONE ENCOUNTER
Discussed with patient findings on MRI brain wwo. MRI demonstrated multiple periventricular, juxtracortical T2 hyperintense lesions concerning for demyelinating disease. Subtle enhancement of R optic nerve enhancement was additionally noted. Pt endorses midthoracic pain with occasional numbness, but denies any thoracic level or saddle anesthesia. Will plan for MRI C/T spine wwo. Referral placed to neuro immunology for MS evaluation

## 2024-12-26 DIAGNOSIS — R93.0 ABNORMAL MRI OF HEAD: ICD-10-CM

## 2024-12-26 DIAGNOSIS — H47.20 OPTIC ATROPHY: ICD-10-CM

## 2024-12-26 RX ORDER — DIAZEPAM 5 MG/1
5 TABLET ORAL PRN
Qty: 2 TABLET | Refills: 0 | Status: SHIPPED | OUTPATIENT
Start: 2024-12-26 | End: 2025-02-08

## 2025-01-22 ENCOUNTER — PHARMACY VISIT (OUTPATIENT)
Dept: PHARMACY | Facility: MEDICAL CENTER | Age: 35
End: 2025-01-22
Payer: COMMERCIAL

## 2025-01-22 ENCOUNTER — TELEPHONE (OUTPATIENT)
Dept: PHARMACY | Facility: MEDICAL CENTER | Age: 35
End: 2025-01-22

## 2025-01-22 ENCOUNTER — OFFICE VISIT (OUTPATIENT)
Dept: NEUROLOGY | Facility: MEDICAL CENTER | Age: 35
End: 2025-01-22
Attending: PSYCHIATRY & NEUROLOGY
Payer: COMMERCIAL

## 2025-01-22 VITALS
HEIGHT: 65 IN | DIASTOLIC BLOOD PRESSURE: 78 MMHG | BODY MASS INDEX: 30.27 KG/M2 | OXYGEN SATURATION: 99 % | TEMPERATURE: 98.6 F | WEIGHT: 181.66 LBS | RESPIRATION RATE: 14 BRPM | SYSTOLIC BLOOD PRESSURE: 118 MMHG | HEART RATE: 113 BPM

## 2025-01-22 DIAGNOSIS — G35 MULTIPLE SCLEROSIS (HCC): Primary | ICD-10-CM

## 2025-01-22 PROCEDURE — 3078F DIAST BP <80 MM HG: CPT | Performed by: PSYCHIATRY & NEUROLOGY

## 2025-01-22 PROCEDURE — RXMED WILLOW AMBULATORY MEDICATION CHARGE: Performed by: INTERNAL MEDICINE

## 2025-01-22 PROCEDURE — 99211 OFF/OP EST MAY X REQ PHY/QHP: CPT | Performed by: PSYCHIATRY & NEUROLOGY

## 2025-01-22 PROCEDURE — 99417 PROLNG OP E/M EACH 15 MIN: CPT | Performed by: PSYCHIATRY & NEUROLOGY

## 2025-01-22 PROCEDURE — 3074F SYST BP LT 130 MM HG: CPT | Performed by: PSYCHIATRY & NEUROLOGY

## 2025-01-22 PROCEDURE — 99215 OFFICE O/P EST HI 40 MIN: CPT | Performed by: PSYCHIATRY & NEUROLOGY

## 2025-01-22 RX ORDER — OFATUMUMAB 20 MG/.4ML
INJECTION, SOLUTION SUBCUTANEOUS
Qty: 0.56 ML | Refills: 11 | Status: SHIPPED | OUTPATIENT
Start: 2025-01-22

## 2025-01-22 RX ORDER — INFLUENZA A VIRUS A/VICTORIA/4897/2022 IVR-238 (H1N1) ANTIGEN (FORMALDEHYDE INACTIVATED), INFLUENZA A VIRUS A/CALIFORNIA/122/2022 SAN-022 (H3N2) ANTIGEN (FORMALDEHYDE INACTIVATED), AND INFLUENZA B VIRUS B/MICHIGAN/01/2021 ANTIGEN (FORMALDEHYDE INACTIVATED) 15; 15; 15 MG/.5ML; MG/.5ML; MG/.5ML
INJECTION, SUSPENSION INTRAMUSCULAR
Qty: 0.5 ML | Refills: 0 | OUTPATIENT
Start: 2025-01-22

## 2025-01-22 ASSESSMENT — PATIENT HEALTH QUESTIONNAIRE - PHQ9
SUM OF ALL RESPONSES TO PHQ QUESTIONS 1-9: 8
5. POOR APPETITE OR OVEREATING: 0 - NOT AT ALL
CLINICAL INTERPRETATION OF PHQ2 SCORE: 2

## 2025-01-22 NOTE — TELEPHONE ENCOUNTER
Received [employer] request via [employer]  for (KESIMPTA) 20 MG/0.4ML Solution Auto-injector. (Quantity:0.4, Day Supply:30)     Insurance: Jayson CASTANO  Member ID:  [id number]  BIN: [id number]  PCN: [id number]  Group: [id number]     Ran Test claim via [employer] & medication Rejects stating prior authorization is required.

## 2025-01-22 NOTE — PROGRESS NOTES
"[facility] NEUROLOGY  MULTIPLE SCLEROSIS & NEUROIMMUNOLOGY  NEW PATIENT VISIT    Referral source: Telma Luther MD    DISEASE SUMMARY:  Principal neurologic diagnosis: MS  Diagnosis of MS: 12/7/2024  Disease History:  - 2020: onset of sensory symptoms (numbness, tingling) in the feet, improved but never completely resolved  - 2021: onset of bladder symptoms (incontinence)  - additional episodes of numbness, self-resolved  - 11/29/2024: onset of visual symptoms (shadow in the left superior quadrant, no pain, no red desaturation), eventually referred to Dr. Luther, vision improved spontaneously  Disease course at onset: relapsing  Current disease course: relapsing  Previous disease therapies:  - none  Current disease therapies:  - none  Symptomatic therapies:  - none  CSF:  - never sampled  Other Testing:  -   MRI head:  -   MRI orbits:  - 12/7/2024: \"segmental focal increased T2 signal intensities within the optic nerves... there is subtle enhancement noted in the one of the T2 hyperintensity... these findings likely represent subacute/acute right optic neuritis... multiple abnormal alexi-ventricular T2 hyper-intensities...\"  MRI cervical spine:  -   MRI thoracic spine:  -   Immunizations:  - influenza?:   - Pneumonia?:  - SARS-CoV-2?:   Cancer Screens:  - mammogram:   - PAP?:   - skin check:     CC: visual field defect, optic atrophy    HISTORY OF ILLNESS:  Kathryn Lozano is a 34 y.o. woman with a history most notable for optic atrophy and MDD.  Today, she was unaccompanied, and she provided the following history:    I have summarized pertinent data above.    MEDICAL AND SURGICAL HISTORY:  Past Medical History:   Diagnosis Date    Chronic low back pain     Seizure disorder (HCC)      Past Surgical History:   Procedure Laterality Date    I&D  2/2009    Rt breast abcess     MEDICATIONS:  Current Outpatient Medications   Medication Sig    pneumococcal 20-Karen Conj Vacc (PREVNAR 20) 0.5 ML Suspension Prefilled " Syringe syringe Inject 0.5 mL into the shoulder, thigh, or buttocks one time for 1 dose.    Ofatumumab (KESIMPTA) 20 MG/0.4ML Solution Auto-injector Initial: 20 mg once weekly for 3 doses (weeks 0, 1, and 2); maintenance: 20 mg once monthly starting at week 4.    diazePAM (VALIUM) 5 MG Tab Take 1 Tablet by mouth as needed for Anxiety (MRI) for up to 2 doses. Take 30 minutes prior to MRI. Can repeat dose if still claustrophobic    FLUoxetine (PROZAC) 20 MG Cap Take 1 Capsule by mouth every day.    ARIPiprazole (ABILIFY) 5 MG tablet Take 1 Tablet by mouth every day.    influenza vaccine (FLUZONE) 0.5 ML Suspension Prefilled Syringe injection Inject  into the shoulder, thigh, or buttocks.    pneumococcal 20-Karen Conj Vacc (PREVNAR 20) 0.5 ML Suspension Prefilled Syringe syringe Inject  into the shoulder, thigh, or buttocks.     SOCIAL HISTORY:  Social History     Tobacco Use    Smoking status: Every Day     Types: Cigarettes    Smokeless tobacco: Never    Tobacco comments:     4/day   Substance Use Topics    Alcohol use: No     Comment: OCCAsIONALLY     Social History     Social History Narrative    ** Merged History Encounter **          FAMILY HISTORY:  Family History   Problem Relation Age of Onset    Alcohol abuse Father     Cancer Father         lung cancer, smoker    Lung Disease Father     Thyroid Maternal Aunt     Rheumatologic Disease Maternal Aunt         RA     REVIEW OF SYSTEMS:  A ROS was completed.  Pertinent positives and negatives were included in the HPI, above.  All other systems were reviewed and are negative.    PHYSICAL EXAM:  General/Medical:  - NAD    Neuro:  MENTAL STATUS: awake and alert; no deficits of speech or language; oriented to conversation; affect was appropriate to situation; pleasant, cooperative    CRANIAL NERVES:    II: acuity: NT, fields: NT, pupils: NT, discs: NT    III/IV/VI: versions: grossly intact    V: facial sensation: NT    VII: facial expression: symmetric    VIII: hearing:  intact to voice    IX/X: palate: NT    XI: shoulder shrug: NT    XII: tongue: NT    MOTOR:  - bulk: NT  - tone: NT  Upper Extremity Strength (R/L)    NT   Elbow flexion NT   Elbow extension NT   Shoulder abduction NT     Lower Extremity Strength  (R/L)   Hip flexion NT   Knee extension NT   Knee flexion NT   Ankle dorsiflexion NT   Ankle plantarflexion NT     - heel-walk: NT  - toe-walk: NT  - pronator drift: absent  - abnormal movements: none    SENSATION:  - light touch: NT  - vibration (R/L, seconds): NT at the great toes  - pinprick: NT  - proprioception: NT  - Romberg: NT    COORDINATION:  - finger to nose: NT  - finger tapping: NT    REFLEXES:  Reflex Right Left   BR NT NT   Biceps NT NT   Triceps NT NT   Patellae NT NT   Achilles NT NT   Toes NT NT     GAIT:  - NT    QUANTITATIVE SCORES:  Timed 25-foot walk (sec): NT today.  Assistive device: none    REVIEW OF IMAGING STUDIES:  I have summarized available data above.    REVIEW OF LABORATORY STUDIES:  I have summarized pertinent data above.    ASSESSMENT:  Kathryn Lozano is a 34 y.o. woman with MS.  She fulfills 2024 Med[place] criteria for a diagnosis of MS due to the presence of signs/symptoms suggestive of MS, lesions present in at least 2 CNS topographies (optic nerve, alexi-ventricular) and dissemination in time (both by history and with simultaneous presence of enhancing and non-enhancing lesions).  Given the high degree of disease burden and severity of symptoms I recommend high-intensity therapy.  We discussed various options and agreed to start Kesimpta.  Plans/recommendations as follows:    PLAN:  MS:  Orders Placed This Encounter    CBC with differential    complete metabolic panel    hepatitis B surface Ab    hepatitis b surface antigen    hepatitis C antibody    IgA    IgG    IgM    lymphocyte subsets (B & T cells)    quantiferon gold plus TB (4 tube)    varicella zoster IgG Ab    vitamin D    pneumococcal 20-Karen Conj Vacc (PREVNAR  "20) 0.5 ML Suspension Prefilled Syringe syringe    Ofatumumab (KESIMPTA) 20 MG/0.4ML Solution Auto-injector     Follow-Up:  - Return in about 1 month (around 2/22/2025).    Signed: Narciso Katz M.D.    BILLING DOCUMENTATION:   I spent 66 minutes reviewing the medical record, interviewing and examining the patient, discussing my impression (see \"assessment\" above), and coordinating care.  "

## 2025-01-23 NOTE — TELEPHONE ENCOUNTER
Prior Authorization for     (KESIMPTA) 20 MG/0.4ML Solution Auto-injector    (Quantity: 0.4, Days: 30) has been submitted via Cover My Meds: Key ([id number])    Insurance: Jayson CASTANO    Will follow up in 24-48 business hours.

## 2025-01-24 NOTE — TELEPHONE ENCOUNTER
Prior Authorization for (KESIMPTA) 20 MG/0.4ML Solution Auto-injector  has been approved for a quantity of 0.4 , day supply 30    Prior Authorization reference number: [id number]  Insurance: Jayson CASTANO  Effective dates: 01/23/25 to 1/23/26  Copay: $Unknown - Pharmacy lockout - tried both npi's     Is patient eligible to fill with [facility]? No, test claim rejected stating fill not appropiate for this location.    Next Steps:  Elvie - will call the pt to let them know the prior auth was approved and then release to the preferred pharmacy that the ins says can fill this prescription.

## 2025-01-28 NOTE — TELEPHONE ENCOUNTER
This rx needs to be filled a [facility]. Called and spoke to patient about releasing the medication to [facility]. Also gave the phone number for the pt to call and also let her know about signing up for the Kesimpta copay assistance. Gave the pt my number to call me if she has any questions.

## 2025-01-29 NOTE — TELEPHONE ENCOUNTER
Medication: (KESIMPTA) 20 MG/0.4ML Solution Auto-injector   Type of Insurance: Commercial  Type of Financial assistance requested Copay Card  Source: [email or web address]*Y1PBGpX0XcmfYnIiTWRhTJRNQMmSi8ENkO4HNUr0K8fHqJDbb62DeSU3GFQYY615pWqLQUOKMBGJDPKSQ3eVJXWKP3N5TK..*_gcl_dc*S4ZFQlL7HsxdMuRxBJRiLUCEDNsYc8ONlG0HDQq4X2aWaQDom44VdRR1LMMJO803aMlHYTAQDWTIXQOWE0eBSVMAA9F8RB..*_gcl_au*KuJnGpTeEZBbVxM0SrUcGbO9LzR.*_ga*NxUeLeM2ClD7NhS8UwGsWcO7Mzw.*_ga_01ELVMG6NR*YMvbJYR2IoOaDy2bXH8jJrO1ErqnVpI6ERddFuCyTT4z  Source Phone #: [phone]  Outcome: Approved  Effective dates: 01/29/25 until 12/31/25  Details/Billing Information:   [id number]  PCN: [id number]  GRP: [id number]  ID: [id number]  Max Award Amount: $84796  Final Copay: $0

## 2025-01-29 NOTE — TELEPHONE ENCOUNTER
Prior Authorization for (KESIMPTA) 20 MG/0.4ML Solution Auto-injector  has been approved for a quantity of 0.4 , day supply 30    Prior Authorization reference number: [id number]  Insurance: [employer] - Pt's 2ndary ins  Effective dates: 01/29/25 - 1/29/26  Copay: $Unknown - [facility]     Is patient eligible to fill with [facility]? No, test claim rejected stating Ins lockout pt needs to fill at [facility].    Next Steps:  Unknown - Secondary ins wants patient to fill at [facility] pharmacy.    PA submitted via [employer] ([id number]) Key - This is for patients for secondary insurance.

## 2025-02-03 ENCOUNTER — TELEPHONE (OUTPATIENT)
Dept: NEUROLOGY | Facility: MEDICAL CENTER | Age: 35
End: 2025-02-03
Payer: COMMERCIAL

## 2025-02-03 NOTE — TELEPHONE ENCOUNTER
Phone Number Called: [phone]    Call outcome:  advised message beloew    Message: [facility] received three different rx for Kesimpta, they were all slighlty different. I gave verbal of the RX from 1/22 in chart.

## 2025-02-03 NOTE — TELEPHONE ENCOUNTER
VOICEMAIL  1. Caller Name: [facility]                      Call Back Number: [phone]    2. Message: Clarification on pt Kesimpta    3. Patient approves office to leave a detailed voicemail/MyChart message: N\A

## 2025-02-09 ENCOUNTER — HOSPITAL ENCOUNTER (OUTPATIENT)
Dept: RADIOLOGY | Facility: MEDICAL CENTER | Age: 35
End: 2025-02-09
Attending: STUDENT IN AN ORGANIZED HEALTH CARE EDUCATION/TRAINING PROGRAM
Payer: COMMERCIAL

## 2025-02-09 DIAGNOSIS — H53.40 VISUAL FIELD DEFECT: ICD-10-CM

## 2025-02-09 DIAGNOSIS — H47.20 OPTIC ATROPHY: ICD-10-CM

## 2025-02-09 DIAGNOSIS — R93.0 ABNORMAL MRI OF HEAD: ICD-10-CM

## 2025-02-09 PROCEDURE — 72157 MRI CHEST SPINE W/O & W/DYE: CPT

## 2025-02-09 PROCEDURE — A9579 GAD-BASE MR CONTRAST NOS,1ML: HCPCS | Mod: JZ | Performed by: STUDENT IN AN ORGANIZED HEALTH CARE EDUCATION/TRAINING PROGRAM

## 2025-02-09 PROCEDURE — 72156 MRI NECK SPINE W/O & W/DYE: CPT

## 2025-02-09 PROCEDURE — 700117 HCHG RX CONTRAST REV CODE 255: Mod: JZ | Performed by: STUDENT IN AN ORGANIZED HEALTH CARE EDUCATION/TRAINING PROGRAM

## 2025-02-09 RX ADMIN — GADOTERIDOL 19 ML: 279.3 INJECTION, SOLUTION INTRAVENOUS at 09:55

## 2025-02-10 ENCOUNTER — HOSPITAL ENCOUNTER (OUTPATIENT)
Dept: LAB | Facility: MEDICAL CENTER | Age: 35
End: 2025-02-10
Attending: PSYCHIATRY & NEUROLOGY
Payer: COMMERCIAL

## 2025-02-10 ENCOUNTER — HOSPITAL ENCOUNTER (OUTPATIENT)
Dept: LAB | Facility: MEDICAL CENTER | Age: 35
End: 2025-02-10
Attending: STUDENT IN AN ORGANIZED HEALTH CARE EDUCATION/TRAINING PROGRAM
Payer: COMMERCIAL

## 2025-02-10 DIAGNOSIS — H53.40 VISUAL FIELD DEFECT: ICD-10-CM

## 2025-02-10 DIAGNOSIS — G35 MULTIPLE SCLEROSIS (HCC): ICD-10-CM

## 2025-02-10 LAB
BASOPHILS # BLD AUTO: 0.8 % (ref 0–1.8)
BASOPHILS # BLD: 0.07 K/UL (ref 0–0.12)
EOSINOPHIL # BLD AUTO: 0.16 K/UL (ref 0–0.51)
EOSINOPHIL NFR BLD: 1.7 % (ref 0–6.9)
ERYTHROCYTE [DISTWIDTH] IN BLOOD BY AUTOMATED COUNT: 38.6 FL (ref 35.9–50)
ERYTHROCYTE [SEDIMENTATION RATE] IN BLOOD BY WESTERGREN METHOD: 2 MM/HOUR (ref 0–25)
HCT VFR BLD AUTO: 46.2 % (ref 37–47)
HGB BLD-MCNC: 15.9 G/DL (ref 12–16)
IMM GRANULOCYTES # BLD AUTO: 0.03 K/UL (ref 0–0.11)
IMM GRANULOCYTES NFR BLD AUTO: 0.3 % (ref 0–0.9)
LYMPHOCYTES # BLD AUTO: 3.25 K/UL (ref 1–4.8)
LYMPHOCYTES NFR BLD: 35.4 % (ref 22–41)
MCH RBC QN AUTO: 29.3 PG (ref 27–33)
MCHC RBC AUTO-ENTMCNC: 34.4 G/DL (ref 32.2–35.5)
MCV RBC AUTO: 85.2 FL (ref 81.4–97.8)
MONOCYTES # BLD AUTO: 0.55 K/UL (ref 0–0.85)
MONOCYTES NFR BLD AUTO: 6 % (ref 0–13.4)
NEUTROPHILS # BLD AUTO: 5.12 K/UL (ref 1.82–7.42)
NEUTROPHILS NFR BLD: 55.8 % (ref 44–72)
NRBC # BLD AUTO: 0 K/UL
NRBC BLD-RTO: 0 /100 WBC (ref 0–0.2)
PLATELET # BLD AUTO: 157 K/UL (ref 164–446)
PMV BLD AUTO: 11 FL (ref 9–12.9)
RBC # BLD AUTO: 5.42 M/UL (ref 4.2–5.4)
WBC # BLD AUTO: 9.2 K/UL (ref 4.8–10.8)

## 2025-02-10 PROCEDURE — 86038 ANTINUCLEAR ANTIBODIES: CPT

## 2025-02-10 PROCEDURE — 86787 VARICELLA-ZOSTER ANTIBODY: CPT

## 2025-02-10 PROCEDURE — 82784 ASSAY IGA/IGD/IGG/IGM EACH: CPT

## 2025-02-10 PROCEDURE — 87340 HEPATITIS B SURFACE AG IA: CPT

## 2025-02-10 PROCEDURE — 86052 AQUAPORIN-4 ANTB CBA EACH: CPT

## 2025-02-10 PROCEDURE — 86777 TOXOPLASMA ANTIBODY: CPT

## 2025-02-10 PROCEDURE — 86357 NK CELLS TOTAL COUNT: CPT

## 2025-02-10 PROCEDURE — 86611 BARTONELLA ANTIBODY: CPT | Mod: 91

## 2025-02-10 PROCEDURE — 82607 VITAMIN B-12: CPT

## 2025-02-10 PROCEDURE — 86780 TREPONEMA PALLIDUM: CPT

## 2025-02-10 PROCEDURE — 86803 HEPATITIS C AB TEST: CPT

## 2025-02-10 PROCEDURE — 86360 T CELL ABSOLUTE COUNT/RATIO: CPT

## 2025-02-10 PROCEDURE — 86480 TB TEST CELL IMMUN MEASURE: CPT

## 2025-02-10 PROCEDURE — 86140 C-REACTIVE PROTEIN: CPT

## 2025-02-10 PROCEDURE — 82746 ASSAY OF FOLIC ACID SERUM: CPT

## 2025-02-10 PROCEDURE — 86706 HEP B SURFACE ANTIBODY: CPT

## 2025-02-10 PROCEDURE — 84425 ASSAY OF VITAMIN B-1: CPT

## 2025-02-10 PROCEDURE — 85025 COMPLETE CBC W/AUTO DIFF WBC: CPT

## 2025-02-10 PROCEDURE — 80053 COMPREHEN METABOLIC PANEL: CPT

## 2025-02-10 PROCEDURE — 86355 B CELLS TOTAL COUNT: CPT

## 2025-02-10 PROCEDURE — 86362 MOG-IGG1 ANTB CBA EACH: CPT

## 2025-02-10 PROCEDURE — 36415 COLL VENOUS BLD VENIPUNCTURE: CPT

## 2025-02-10 PROCEDURE — 86359 T CELLS TOTAL COUNT: CPT

## 2025-02-10 PROCEDURE — 82306 VITAMIN D 25 HYDROXY: CPT

## 2025-02-10 PROCEDURE — 84590 ASSAY OF VITAMIN A: CPT

## 2025-02-10 PROCEDURE — 86778 TOXOPLASMA ANTIBODY IGM: CPT

## 2025-02-10 PROCEDURE — 85652 RBC SED RATE AUTOMATED: CPT

## 2025-02-11 LAB
25(OH)D3 SERPL-MCNC: 22 NG/ML (ref 30–100)
ALBUMIN SERPL BCP-MCNC: 4.5 G/DL (ref 3.2–4.9)
ALBUMIN/GLOB SERPL: 1.5 G/DL
ALP SERPL-CCNC: 56 U/L (ref 30–99)
ALT SERPL-CCNC: 22 U/L (ref 2–50)
ANION GAP SERPL CALC-SCNC: 14 MMOL/L (ref 7–16)
AST SERPL-CCNC: 29 U/L (ref 12–45)
BILIRUB SERPL-MCNC: 1.1 MG/DL (ref 0.1–1.5)
BUN SERPL-MCNC: 13 MG/DL (ref 8–22)
CALCIUM ALBUM COR SERPL-MCNC: 9.1 MG/DL (ref 8.5–10.5)
CALCIUM SERPL-MCNC: 9.5 MG/DL (ref 8.5–10.5)
CHLORIDE SERPL-SCNC: 100 MMOL/L (ref 96–112)
CO2 SERPL-SCNC: 24 MMOL/L (ref 20–33)
CREAT SERPL-MCNC: 0.78 MG/DL (ref 0.5–1.4)
CRP SERPL HS-MCNC: <0.3 MG/DL (ref 0–0.75)
FOLATE SERPL-MCNC: 9.8 NG/ML
GFR SERPLBLD CREATININE-BSD FMLA CKD-EPI: 101 ML/MIN/1.73 M 2
GLOBULIN SER CALC-MCNC: 3.1 G/DL (ref 1.9–3.5)
GLUCOSE SERPL-MCNC: 82 MG/DL (ref 65–99)
HBV SURFACE AB SERPL IA-ACNC: 42.6 MIU/ML (ref 0–10)
HBV SURFACE AG SER QL: NORMAL
HCV AB SER QL: NORMAL
POTASSIUM SERPL-SCNC: 3.9 MMOL/L (ref 3.6–5.5)
PROT SERPL-MCNC: 7.6 G/DL (ref 6–8.2)
SODIUM SERPL-SCNC: 138 MMOL/L (ref 135–145)
T PALLIDUM AB SER QL IA: NORMAL
VIT B12 SERPL-MCNC: 529 PG/ML (ref 211–911)

## 2025-02-12 LAB
ANNOTATION COMMENT IMP: ABNORMAL
AQP4 H2O CHANNEL IGG SERPL QL IF: NORMAL
CD19 CELLS NFR SPEC: 16 % (ref 6–23)
CD3 CELLS # BLD: 2420 CELLS/UL (ref 570–2400)
CD3 CELLS NFR SPEC: 74 % (ref 62–87)
CD3+CD4+ CELLS # BLD: 1569 CELLS/UL (ref 430–1800)
CD3+CD4+ CELLS NFR BLD: 48 % (ref 32–64)
CD3+CD4+ CELLS/CD3+CD8+ CLL BLD: 2 RATIO (ref 0.8–3.9)
CD3+CD8+ CELLS # BLD: 782 CELLS/UL (ref 210–1200)
CD3+CD8+ CELLS NFR SPEC: 24 % (ref 15–46)
CD3-CD16+CD56+ CELLS # SPEC: 296 CELLS/UL (ref 78–470)
CD3-CD16+CD56+ CELLS NFR SPEC: 9 % (ref 4–26)
CELLS.CD3-CD19+ [#/VOLUME] IN BLOOD: 511 CELLS/UL (ref 91–610)
GAMMA INTERFERON BACKGROUND BLD IA-ACNC: 0.04 IU/ML
IGA SERPL-MCNC: 200 MG/DL (ref 68–408)
IGG SERPL-MCNC: 985 MG/DL (ref 768–1632)
IGM SERPL-MCNC: 459 MG/DL (ref 35–263)
M TB IFN-G BLD-IMP: ABNORMAL
M TB IFN-G CD4+ BCKGRND COR BLD-ACNC: 0 IU/ML
MITOGEN IGNF BCKGRD COR BLD-ACNC: 0.02 IU/ML
MOG AB SER QL CBA IFA: NORMAL
NUCLEAR IGG SER QL IA: NORMAL
QFT TB2 - NIL TBQ2: 0.02 IU/ML
T GONDII IGG SER-ACNC: <3 IU/ML
T GONDII IGM SER-ACNC: <3 AU/ML
VZV IGG SER IA-ACNC: 1.3 S/CO

## 2025-02-13 DIAGNOSIS — G35 MULTIPLE SCLEROSIS (HCC): Primary | ICD-10-CM

## 2025-02-13 LAB
ANNOTATION COMMENT IMP: NORMAL
B HENSELAE IGG TITR SER IF: NORMAL {TITER}
B HENSELAE IGM TITR SER IF: NORMAL {TITER}
B QUINTANA IGG TITR SER: NORMAL {TITER}
B QUINTANA IGM TITR SER: NORMAL {TITER}
RETINYL PALMITATE SERPL-MCNC: <0.02 MG/L (ref 0–0.1)
VIT A SERPL-MCNC: 0.59 MG/L (ref 0.3–1.2)
VIT B1 BLD-MCNC: 135 NMOL/L (ref 70–180)

## 2025-02-13 RX ORDER — ERGOCALCIFEROL 1.25 MG/1
50000 CAPSULE, LIQUID FILLED ORAL
Qty: 8 CAPSULE | Refills: 0 | Status: SHIPPED | OUTPATIENT
Start: 2025-02-13 | End: 2025-04-04

## 2025-02-18 ENCOUNTER — TELEMEDICINE (OUTPATIENT)
Dept: NEUROLOGY | Facility: MEDICAL CENTER | Age: 35
End: 2025-02-18
Attending: PSYCHIATRY & NEUROLOGY
Payer: COMMERCIAL

## 2025-02-18 VITALS — WEIGHT: 140 LBS | HEIGHT: 65 IN | BODY MASS INDEX: 23.32 KG/M2

## 2025-02-18 DIAGNOSIS — G35 MULTIPLE SCLEROSIS (HCC): ICD-10-CM

## 2025-02-18 ASSESSMENT — FIBROSIS 4 INDEX: FIB4 SCORE: 1.42

## 2025-02-18 ASSESSMENT — PATIENT HEALTH QUESTIONNAIRE - PHQ9
SUM OF ALL RESPONSES TO PHQ QUESTIONS 1-9: 5
CLINICAL INTERPRETATION OF PHQ2 SCORE: 1
5. POOR APPETITE OR OVEREATING: 0 - NOT AT ALL

## 2025-02-18 NOTE — PROGRESS NOTES
"[facility] NEUROLOGY  MULTIPLE SCLEROSIS & NEUROIMMUNOLOGY  FOLLOW-UP VISIT    This evaluation was conducted via Teams using secure and encrypted videoconferencing technology. The patient was in their home in the [facility].    The patient's identity was confirmed and verbal consent was obtained for this virtual visit.    DISEASE SUMMARY:  Principal neurologic diagnosis: MS  Diagnosis of MS: 12/7/2024  Disease History:  - 2020: onset of sensory symptoms (numbness, tingling) in the feet, improved but never completely resolved  - 2021: onset of bladder symptoms (incontinence)  - additional episodes of numbness, self-resolved  - 11/29/2024: onset of visual symptoms (shadow in the left superior quadrant, no pain, no red desaturation), eventually referred to Dr. Luther, vision improved spontaneously  Disease course at onset: relapsing  Current disease course: relapsing  Previous disease therapies:  - none  Current disease therapies:  - none  Symptomatic therapies:  - none  CSF:  - never sampled  Other Testing:  -   MRI head:  -   MRI orbits:  - 12/7/2024: \"segmental focal increased T2 signal intensities within the optic nerves... there is subtle enhancement noted in the one of the T2 hyperintensity... these findings likely represent subacute/acute right optic neuritis... multiple abnormal alexi-ventricular T2 hyper-intensities...\"  MRI cervical spine:  -   MRI thoracic spine:  -   Immunizations:  - influenza?:   - Pneumonia?:  - SARS-CoV-2?:   Cancer Screens:  - mammogram:   - PAP?:   - skin check:     CC: MS    INTERVAL HISTORY:  Kathryn Lozano is a 34 y.o. woman with MS.  I last saw her in the [facility] on 1/22/2025.  At that time I recommended she complete pre-treatment blood work and, if normal, start Kesimpta.  Today, I followed up with her via Teams, and she provided the following interval history:    Ms. Lozano has not yet started Kesimpta.  She plans to repeat Quantiferon testing " soon.    MEDICATIONS:  Current Outpatient Medications   Medication Sig    vitamin D2, Ergocalciferol, (DRISDOL) 1.25 MG (69946 UT) Cap capsule Take 1 Capsule by mouth every 7 days for 8 doses.    Ofatumumab (KESIMPTA) 20 MG/0.4ML Solution Auto-injector Initial: 20 mg once weekly for 3 doses (weeks 0, 1, and 2); maintenance: 20 mg once monthly starting at week 4.    FLUoxetine (PROZAC) 20 MG Cap Take 1 Capsule by mouth every day.    ARIPiprazole (ABILIFY) 5 MG tablet Take 1 Tablet by mouth every day.    influenza vaccine (FLUZONE) 0.5 ML Suspension Prefilled Syringe injection Inject  into the shoulder, thigh, or buttocks.    pneumococcal 20-Karen Conj Vacc (PREVNAR 20) 0.5 ML Suspension Prefilled Syringe syringe Inject  into the shoulder, thigh, or buttocks.     MEDICAL, SOCIAL, AND FAMILY HISTORY:  There is no change in the patient's ROS or medical, social, or family histories since the previous visit on 1/22/2025.    REVIEW OF SYSTEMS:  A ROS was completed.  Pertinent positives and negatives were included in the HPI, above.  All other systems were reviewed and are negative.    PHYSICAL EXAM:  General/Medical:  - NAD    Neuro:  MENTAL STATUS: awake and alert; no deficits of speech or language; oriented to conversation; affect was appropriate to situation; pleasant, cooperative    CRANIAL NERVES:    II: acuity: NT, fields: NT, pupils: NT, discs: NT    III/IV/VI: versions: grossly intact    V: facial sensation: NT    VII: facial expression: symmetric    VIII: hearing: intact to voice    IX/X: palate: NT    XI: shoulder shrug: NT    XII: tongue: NT    MOTOR:  - bulk: NT  - tone: NT  Upper Extremity Strength (R/L)    NT   Elbow flexion NT   Elbow extension NT   Shoulder abduction NT     Lower Extremity Strength  (R/L)   Hip flexion NT   Knee extension NT   Knee flexion NT   Ankle dorsiflexion NT   Ankle plantarflexion NT     - heel-walk: NT  - toe-walk: NT  - pronator drift: absent  - abnormal movements:  "none    SENSATION:  - light touch: NT  - vibration (R/L, seconds): NT at the great toes  - pinprick: NT  - proprioception: NT  - Romberg: NT    COORDINATION:  - finger to nose: NT  - finger tapping: NT    REFLEXES:  Reflex Right Left   BR NT NT   Biceps NT NT   Triceps NT NT   Patellae NT NT   Achilles NT NT   Toes NT NT     GAIT:  - NT    QUANTITATIVE SCORES:  Timed 25-foot walk (sec): NT.  Assistive device: none    REVIEW OF IMAGING STUDIES:  No additional data since the last visit.    REVIEW OF LABORATORY STUDIES:  1/20/2025:  - Quantiferon: equivocal    ASSESSMENT:  Kathryn Lozano is a 34 y.o. woman with MS.  I completed disability paperwork with her input.  Plan to follow up results of repeat Quantiferon testing, and assuming this is negative we will proceed with Kesimpta treatment.    PLAN:  MS:  - follow up results of repeat Quantiferon testing  - hope to start Kesimpta    Follow-Up:  - Return in about 2 months (around 4/18/2025).    Signed: Narciso Katz M.D.    BILLING DOCUMENTATION:   I spent 25 minutes reviewing the medical record, interviewing and examining the patient, discussing my impression (see \"assessment\" above), and coordinating care.  "

## 2025-02-19 ENCOUNTER — TELEMEDICINE (OUTPATIENT)
Dept: OPHTHALMOLOGY | Facility: MEDICAL CENTER | Age: 35
End: 2025-02-19
Payer: COMMERCIAL

## 2025-02-19 DIAGNOSIS — H46.9 OPTIC NEURITIS: ICD-10-CM

## 2025-02-19 DIAGNOSIS — H47.20 OPTIC ATROPHY: ICD-10-CM

## 2025-02-19 DIAGNOSIS — G35 MULTIPLE SCLEROSIS (HCC): ICD-10-CM

## 2025-02-19 NOTE — PROGRESS NOTES
Peds/Neuro Ophthalmology Virtual Visit :   Telma Luther M.D.    Date & Time note created:    2/19/2025   4:05 PM     Referring MD / APRN:  Guanakito Birmingham D.O., No att. providers found    Patient ID:  Name:             Kathrny Lozano   YOB: 1990  Age:                 34 y.o.  female   MRN:               [id number]    Chief Complaint/Reason for Visit:     Multiple Sclerosis      History of Present Illness:      Pietro Lozano is a 35 yo woman initially referred by [facility] retina for visual field defect OD. She presents for virtual follow up    Pietro was last seen 2/19/25. She has since followed up with Dr Katz with plans to initiate Kesimpta for MS. Today she reports vision OD is improved. She denies any concerns OS.     As a recap,   Pietro began experiencing blurred vision OD 10/30/24. She reported a superonasal shadow in the R eye that was constant, but denied eye pain. She reported being hit in the nose by her pitbull the day prior to vision loss 10/29.She denied any visual concerns in the left eye. Exam 11/4/24 with [facility] retina demonstrated VA 20/350+1 OD 20/20-2 OS, normal optic nerves. FA was normal. [facility] additionally reported normal    At her initial evaluation 11/14/24 she was found to have temporal pallor and RNFL thinning in her nonsymptomatic L eye and decreased afferent visual function OD. There was not disc edema or disc pallor in the R eye. Labs were ordered and demonstrated a negative  CNS demyelinating, ESR/CRP, MARVEL, TB, RPR, bartonella, Toxo, B1,B12, folate. MRI orbits wwo demonstrated findings consistent with MS. She was referred to neuro immunology and seen 2/18/25 where she was officially diagnosed with MS     History of migraine headaches  Severity: 10/10  Associated symptoms; nausea, fatigue, light sound sensitivity  Location: top of head  Characteristic: pressure  Duration: all day  Frequency: ranges from twice a month to once every 6 months   This past  week she has had two episodes    She has a 5 pack year smoking history. Denies any excessive alcohol use or family history of vision loss        Review of Systems:  ROS    Past Medical History:   Past Medical History:   Diagnosis Date    Chronic low back pain     Seizure disorder (HCC)        Past Surgical History:  Past Surgical History:   Procedure Laterality Date    I&D  2/2009    Rt breast abcess       Current Outpatient Medications:  Current Outpatient Medications   Medication Sig Dispense Refill    vitamin D2, Ergocalciferol, (DRISDOL) 1.25 MG (82947 UT) Cap capsule Take 1 Capsule by mouth every 7 days for 8 doses. 8 Capsule 0    Ofatumumab (KESIMPTA) 20 MG/0.4ML Solution Auto-injector Initial: 20 mg once weekly for 3 doses (weeks 0, 1, and 2); maintenance: 20 mg once monthly starting at week 4. 0.56 mL 11    FLUoxetine (PROZAC) 20 MG Cap Take 1 Capsule by mouth every day. 90 Capsule 3    ARIPiprazole (ABILIFY) 5 MG tablet Take 1 Tablet by mouth every day. 90 Tablet 3     No current facility-administered medications for this visit.       Allergies:  Allergies   Allergen Reactions    Tape     Vicodin [Hydrocodone-Acetaminophen]        Family History:  Family History   Problem Relation Age of Onset    Alcohol abuse Father     Cancer Father         lung cancer, smoker    Lung Disease Father     Thyroid Maternal Aunt     Rheumatologic Disease Maternal Aunt         RA       Social History:  Social History     Socioeconomic History    Marital status:      Spouse name: Not on file    Number of children: Not on file    Years of education: Not on file    Highest education level: Not on file   Occupational History    Not on file   Tobacco Use    Smoking status: Every Day     Types: Cigarettes    Smokeless tobacco: Never    Tobacco comments:     4/day   Vaping Use    Vaping status: Never Used   Substance and Sexual Activity    Alcohol use: No     Comment: OCCAsIONALLY    Drug use: No    Sexual activity: Not on  file   Other Topics Concern    Not on file   Social History Narrative    ** Merged History Encounter **          Social Drivers of Health     Financial Resource Strain: Not on file   Food Insecurity: Not on file   Transportation Needs: Not on file   Physical Activity: Not on file   Stress: Not on file   Social Connections: Not on file   Intimate Partner Violence: Not on file   Housing Stability: Not on file          Physical Exam:  Physical Exam    Oriented x 3  Weight/BMI: There is no height or weight on file to calculate BMI.  There were no vitals taken for this visit.    Base Eye Exam       Neuro/Psych    Tearful   Alert and oriented  No dysarthria   No facial asymmetry  Speech fluent                    Pertinent Lab/Test/Imaging Review:  [facility] wwo : no acute intracranial abnormality     Labs 2/10/25  B1  135  MOG < 1:10  NMO <1:10  CRP < 0.3  ESR 2  Bartonella neg  MARVEL none  Toxo neg  T Pallidum non reactive  Vitamin A 0.59    MRI orbits wwo 12/7/24  My review: T2 hyperintense signal in bilateral optic nerves with subtle enhancement in the R optic nerve. Multiple periventricular T2 hyperintense lesions concerning for demyelinating disease.     MRI C spine wwo 2/10/25:   No demyelinating lesions, degenerative changes greatest at C5-6    MRI T spine wwo 2/9/25  No demyelinating lesions, R paracentral disc protrusion at T9-10 with minimal effacement of the R lateral recess  Assessment and Plan:     Optic neuritis  33 yo woman who presents with visual field defect OD     Onset 10/30/24 following head trauma. She reports a superonasal shadow in the R eye that is constant and does not move location. She is not sure if it is improving. Denies any eye pain or headaches. No involvement OS. Seen by HD retina where HA reported normal. [facility] normal     Exam 11/14/24: No APD, VA 20/400 OD, 20/20 OS, 6/9 color plates OD 9/9 OS, generalized depression OD with some sparing of the inferonasal quadrant. EOM full, ortho alignment. R  optic nerve appears pink with sharp disc margins. Mild temporal pallor OS. RNFL 100 OD 83 OS. Amsler grid demonstrates multiple paracentral scotomas, the largest of which is in the superonasal grid    Exam 2/19/25 Virtual visit: no facial asymmetry. Tearful mood. No dysarthria, fluent speech. EOM full     Plan:   Infectious, autoimmune and nutritional labs negative. MRI orbits wwo demonstrated a small segment of R optic nerve enhancement with evidence of T2 hyperintense lesion in the L optic nerve and multiple periventricular T2 hyperintense lesions. Pt diagnosed with MS by Dr Katz with plans to start Kesimpta. MRI C and T spine negative for demyelinating disease    -Continue management with Dr Katz  -RTC in 6 months, sooner if symptoms worsen     Optic atrophy  Temporal pallor OS with corresponding RNFL thinning (83). Pt denies any vision changes or pain OS. Noted incidentally. MRI orbits wwo demonstrated T2 hyperintense signal in the L optic nerve suggesting prior demyelinating event.     Multiple sclerosis (HCC)  Following with Dr Katz  Plans to start Kesimpta          Telma Luther M.D.    43 total minutes were spent reviewing imaging, records, examining the patient and documenting.     This evaluation was conducted via Teams using secure and encrypted videoconferencing technology. The patient was in their home in the state [facility].    The patient's identity was confirmed and verbal consent was obtained for this virtual visit.

## 2025-02-20 NOTE — ASSESSMENT & PLAN NOTE
35 yo woman who presents with visual field defect OD     Onset 10/30/24 following head trauma. She reports a superonasal shadow in the R eye that is constant and does not move location. She is not sure if it is improving. Denies any eye pain or headaches. No involvement OS. Seen by HD retina where HA reported normal. CTH normal     Exam 11/14/24: No APD, VA 20/400 OD, 20/20 OS, 6/9 color plates OD 9/9 OS, generalized depression OD with some sparing of the inferonasal quadrant. EOM full, ortho alignment. R optic nerve appears pink with sharp disc margins. Mild temporal pallor OS. RNFL 100 OD 83 OS. Amsler grid demonstrates multiple paracentral scotomas, the largest of which is in the superonasal grid    Exam 2/19/25 Virtual visit: no facial asymmetry. Tearful mood. No dysarthria, fluent speech. EOM full     Plan:   Infectious, autoimmune and nutritional labs negative. MRI orbits wwo demonstrated a small segment of R optic nerve enhancement with evidence of T2 hyperintense lesion in the L optic nerve and multiple periventricular T2 hyperintense lesions. Pt diagnosed with MS by Dr Katz with plans to start Kesimpta. MRI C and T spine negative for demyelinating disease    -Continue management with Dr Katz  -RTC in 6 months, sooner if symptoms worsen

## 2025-02-20 NOTE — ASSESSMENT & PLAN NOTE
Temporal pallor OS with corresponding RNFL thinning (83). Pt denies any vision changes or pain OS. Noted incidentally. MRI orbits wwo demonstrated T2 hyperintense signal in the L optic nerve suggesting prior demyelinating event.

## 2025-02-27 ENCOUNTER — TELEPHONE (OUTPATIENT)
Dept: PHARMACY | Facility: MEDICAL CENTER | Age: 35
End: 2025-02-27
Payer: COMMERCIAL

## 2025-02-27 ENCOUNTER — OFFICE VISIT (OUTPATIENT)
Dept: NEUROLOGY | Facility: MEDICAL CENTER | Age: 35
End: 2025-02-27
Attending: PSYCHIATRY & NEUROLOGY
Payer: COMMERCIAL

## 2025-02-27 VITALS
BODY MASS INDEX: 29.9 KG/M2 | HEIGHT: 65 IN | TEMPERATURE: 98.9 F | WEIGHT: 179.45 LBS | OXYGEN SATURATION: 96 % | RESPIRATION RATE: 18 BRPM | DIASTOLIC BLOOD PRESSURE: 78 MMHG | SYSTOLIC BLOOD PRESSURE: 132 MMHG | HEART RATE: 104 BPM

## 2025-02-27 DIAGNOSIS — G43.109 MIGRAINE WITH AURA AND WITHOUT STATUS MIGRAINOSUS, NOT INTRACTABLE: Primary | ICD-10-CM

## 2025-02-27 PROCEDURE — 99211 OFF/OP EST MAY X REQ PHY/QHP: CPT | Performed by: PSYCHIATRY & NEUROLOGY

## 2025-02-27 RX ORDER — ONDANSETRON 4 MG/1
4 TABLET, FILM COATED ORAL EVERY 4 HOURS PRN
Qty: 20 TABLET | Refills: 11 | Status: SHIPPED | OUTPATIENT
Start: 2025-02-27 | End: 2026-02-27

## 2025-02-27 RX ORDER — PROPRANOLOL HCL 20 MG
20 TABLET ORAL 2 TIMES DAILY
Qty: 60 TABLET | Refills: 11 | Status: SHIPPED | OUTPATIENT
Start: 2025-02-27 | End: 2026-02-22

## 2025-02-27 ASSESSMENT — PATIENT HEALTH QUESTIONNAIRE - PHQ9
SUM OF ALL RESPONSES TO PHQ QUESTIONS 1-9: 8
5. POOR APPETITE OR OVEREATING: 0 - NOT AT ALL
CLINICAL INTERPRETATION OF PHQ2 SCORE: 1

## 2025-02-27 ASSESSMENT — FIBROSIS 4 INDEX: FIB4 SCORE: 1.42

## 2025-02-27 NOTE — PROGRESS NOTES
"[facility] NEUROLOGY  MULTIPLE SCLEROSIS & NEUROIMMUNOLOGY  FOLLOW-UP VISIT    DISEASE SUMMARY:  Principal neurologic diagnosis: MS  Diagnosis of MS: 12/7/2024  Disease History:  - 2020: onset of sensory symptoms (numbness, tingling) in the feet, improved but never completely resolved  - 2021: onset of bladder symptoms (incontinence)  - additional episodes of numbness, self-resolved  - 11/29/2024: onset of visual symptoms (shadow in the left superior quadrant, no pain, no red desaturation), eventually referred to Dr. Luther, vision improved spontaneously  Disease course at onset: relapsing  Current disease course: relapsing  Previous disease therapies:  - none  Current disease therapies:  - none  Symptomatic therapies:  - none  CSF:  - never sampled  Other Testing:  -   MRI head:  -   MRI orbits:  - 12/7/2024: \"segmental focal increased T2 signal intensities within the optic nerves... there is subtle enhancement noted in the one of the T2 hyperintensity... these findings likely represent subacute/acute right optic neuritis... multiple abnormal alexi-ventricular T2 hyper-intensities...\"  MRI cervical spine:  -   MRI thoracic spine:  -   Immunizations:  - influenza?:   - Pneumonia?:  - SARS-CoV-2?:   Cancer Screens:  - mammogram:   - PAP?:   - skin check:     CC: MS    INTERVAL HISTORY:  Kathryn Lozano is a 34 y.o. woman with MS.  I last saw her via Teams on 2/18/2025.  At that time I recommended she start Kesimpta.  Today, she was unaccompanied, and she provided the following interval history:    MS:  Ms. Lozano has not yet started Kesimpta.  She plans to repeat Quantiferon testing today    Headache:  The following is a summary of headache symptoms, presented in my standard format:    Family History: none  Age at onset (years): elementary school  Location: retro-orbital, usually unilateral (either side)  Radiation: back of the eye sockets  Frequency: baseline: 3/week, lately:   Duration: baseline: hours to " an entire day, lately:   Headache Days/Month: baseline: 9/30, lately:   Quality: squeezing, throbbing  Intensity: baseline: 5-6/10, lately:   Aura: nausea  Photophobia/Phonophobia/Nausea/Vomiting: yes/yes/yes/yes  Provoked by Physical Activity?:   Triggers: hunger, dehydration  Associated Symptoms:   Autonomic Signs (such as ptosis, miosis, conjunctival injection, rhinorrhea, increased lacrimation): none  Head Trauma:   Association with Menses: no, IUD  ED Visits: none  Hospitalizations: none  Missed Work Days ():   Sleep (hours/night): 6-8  Caffeine Intake: no more than 2/day  Hydration: well hydrated  Nutrition: regular meals  Exercise:   Analgesic Overuse:     Current Medication Regimen:  -     Medications Tried: Response  Preventive:  -     Rescue:  - sumatriptan: inconsistently effective  - rizatriptan: inconsistently effective    Medications Not Tried:  - tricyclic antidepressants: contraindicated due to med-med interaction with fluoxetine  - topiramate:       MEDICATIONS:  Current Outpatient Medications   Medication Sig    vitamin D2, Ergocalciferol, (DRISDOL) 1.25 MG (68260 UT) Cap capsule Take 1 Capsule by mouth every 7 days for 8 doses.    FLUoxetine (PROZAC) 20 MG Cap Take 1 Capsule by mouth every day.    ARIPiprazole (ABILIFY) 5 MG tablet Take 1 Tablet by mouth every day.    Ofatumumab (KESIMPTA) 20 MG/0.4ML Solution Auto-injector Initial: 20 mg once weekly for 3 doses (weeks 0, 1, and 2); maintenance: 20 mg once monthly starting at week 4.     MEDICAL, SOCIAL, AND FAMILY HISTORY:  There is no change in the patient's ROS or medical, social, or family histories since the previous visit on 2/18/2025.    REVIEW OF SYSTEMS:  A ROS was completed.  Pertinent positives and negatives were included in the HPI, above.  All other systems were reviewed and are negative.    PHYSICAL EXAM:  General/Medical:  - NAD    Neuro:  MENTAL STATUS: awake and alert; no deficits of speech or language; oriented to conversation;  affect was appropriate to situation; pleasant, cooperative    CRANIAL NERVES:    II: acuity: NT, fields: NT, pupils: NT, discs: NT    III/IV/VI: versions: grossly intact    V: facial sensation: NT    VII: facial expression: symmetric    VIII: hearing: intact to voice    IX/X: palate: NT    XI: shoulder shrug: NT    XII: tongue: NT    MOTOR:  - bulk: NT  - tone: NT  Upper Extremity Strength (R/L)    NT   Elbow flexion NT   Elbow extension NT   Shoulder abduction NT     Lower Extremity Strength  (R/L)   Hip flexion NT   Knee extension NT   Knee flexion NT   Ankle dorsiflexion NT   Ankle plantarflexion NT     - heel-walk: NT  - toe-walk: NT  - pronator drift: absent  - abnormal movements: none    SENSATION:  - light touch: NT  - vibration (R/L, seconds): NT at the great toes  - pinprick: NT  - proprioception: NT  - Romberg: NT    COORDINATION:  - finger to nose: NT  - finger tapping: NT    REFLEXES:  Reflex Right Left   BR NT NT   Biceps NT NT   Triceps NT NT   Patellae NT NT   Achilles NT NT   Toes NT NT     GAIT:  - NT    QUANTITATIVE SCORES:  Timed 25-foot walk (sec): NT.  Assistive device: none    REVIEW OF IMAGING STUDIES:  No additional data since the last visit.    REVIEW OF LABORATORY STUDIES:  No additional data since the last visit.    ASSESSMENT:  Kathryn Lozano is a 34 y.o. woman with MS and migraine with aura.  Plans/recommendations as follow:    PLAN:  MS:  - follow up results of repeat Quantiferon testing  - hope to start Kesimpta    Migraine w/ Aura:  Prevention:  - start propranolol with a goal dosage of 20 mg BID; most common side effects discussed  - get 7-9 hours of sleep per night; can try supplementing melatonin 2-10 mg, 2-3 hours before bedtime  - drink plenty of fluids (urine should be nearly clear)  - avoid excessive caffeine intake (no more than 2 servings per day and nothing in the afternoon)  - eat regular meals (don't skip meals)  - get moderate exercise (even just a 20  minute walk daily)    Rescue:  - Ubrelvy 100 mg: take this at the onset of aura or headache pain; may re-dose x1 after 2 hours if headache persists  - ondansetron 4 mg: take this at the onset of aura or headache to prevent or reduce nausea; may re-dose after 2 hours if headache or nausea persist; do not use more often than 3 days/week  - do not use analgesics (e.g., ibuprofen, acetaminophen) more than 2 days per week in order to avoid analgesic rebound headaches    - keep a headache log    Follow-Up:  - Return in about 2 months (around 4/27/2025).    Signed: Narciso Katz M.D.

## 2025-02-28 NOTE — TELEPHONE ENCOUNTER
Received [employer]  request via [employer]  for   Ubrogepant 100 MG Tab . (Quantity:8, Day Supply:30)     Insurance: Jayson CASTANO  Member ID:  [id number]  BIN: [id number]  PCN: [id number]  Group: [id number]     Ran Test claim via [place] & medication Pays for a $0 copay. Will outreach to patient to offer specialty pharmacy services and or release to preferred pharmacy

## 2025-03-20 ENCOUNTER — PATIENT MESSAGE (OUTPATIENT)
Dept: NEUROLOGY | Facility: MEDICAL CENTER | Age: 35
End: 2025-03-20
Payer: COMMERCIAL

## 2025-03-25 ENCOUNTER — PATIENT MESSAGE (OUTPATIENT)
Dept: NEUROLOGY | Facility: MEDICAL CENTER | Age: 35
End: 2025-03-25
Payer: COMMERCIAL

## 2025-04-08 ENCOUNTER — PATIENT MESSAGE (OUTPATIENT)
Dept: NEUROLOGY | Facility: MEDICAL CENTER | Age: 35
End: 2025-04-08
Payer: COMMERCIAL

## 2025-04-14 ENCOUNTER — PATIENT MESSAGE (OUTPATIENT)
Dept: NEUROLOGY | Facility: MEDICAL CENTER | Age: 35
End: 2025-04-14
Payer: COMMERCIAL

## 2025-04-18 ENCOUNTER — HOSPITAL ENCOUNTER (OUTPATIENT)
Dept: LAB | Facility: MEDICAL CENTER | Age: 35
End: 2025-04-18
Attending: PSYCHIATRY & NEUROLOGY
Payer: COMMERCIAL

## 2025-04-18 DIAGNOSIS — G35 MULTIPLE SCLEROSIS (HCC): ICD-10-CM

## 2025-04-18 PROCEDURE — 86480 TB TEST CELL IMMUN MEASURE: CPT

## 2025-04-20 LAB
GAMMA INTERFERON BACKGROUND BLD IA-ACNC: 0.04 IU/ML
M TB IFN-G BLD-IMP: NEGATIVE
M TB IFN-G CD4+ BCKGRND COR BLD-ACNC: 0.01 IU/ML
MITOGEN IGNF BCKGRD COR BLD-ACNC: 7.94 IU/ML
QFT TB2 - NIL TBQ2: 0.02 IU/ML

## 2025-05-05 ENCOUNTER — PATIENT MESSAGE (OUTPATIENT)
Dept: NEUROLOGY | Facility: MEDICAL CENTER | Age: 35
End: 2025-05-05
Payer: COMMERCIAL

## 2025-05-22 ENCOUNTER — APPOINTMENT (OUTPATIENT)
Dept: MEDICAL GROUP | Facility: MEDICAL CENTER | Age: 35
End: 2025-05-22
Payer: COMMERCIAL

## 2025-05-29 ENCOUNTER — TELEMEDICINE (OUTPATIENT)
Dept: NEUROLOGY | Facility: MEDICAL CENTER | Age: 35
End: 2025-05-29
Attending: PSYCHIATRY & NEUROLOGY
Payer: COMMERCIAL

## 2025-05-29 VITALS
WEIGHT: 150 LBS | BODY MASS INDEX: 25.61 KG/M2 | DIASTOLIC BLOOD PRESSURE: 87 MMHG | SYSTOLIC BLOOD PRESSURE: 132 MMHG | HEART RATE: 92 BPM | HEIGHT: 64 IN

## 2025-05-29 DIAGNOSIS — G35 MULTIPLE SCLEROSIS (HCC): ICD-10-CM

## 2025-05-29 DIAGNOSIS — G43.109 MIGRAINE WITH AURA AND WITHOUT STATUS MIGRAINOSUS, NOT INTRACTABLE: Primary | ICD-10-CM

## 2025-05-29 DIAGNOSIS — F32.2 SEVERE MAJOR DEPRESSION (HCC): ICD-10-CM

## 2025-05-29 PROCEDURE — 999999 HB NO CHARGE: Performed by: PSYCHIATRY & NEUROLOGY

## 2025-05-29 RX ORDER — DEXTROAMPHETAMINE SACCHARATE, AMPHETAMINE ASPARTATE, DEXTROAMPHETAMINE SULFATE, AND AMPHETAMINE SULFATE 5; 5; 5; 5 MG/1; MG/1; MG/1; MG/1
20 TABLET ORAL 2 TIMES DAILY
COMMUNITY

## 2025-05-29 RX ORDER — GABAPENTIN 300 MG/1
CAPSULE ORAL
Qty: 60 CAPSULE | Refills: 11 | Status: SHIPPED | OUTPATIENT
Start: 2025-05-29 | End: 2026-05-29

## 2025-05-29 ASSESSMENT — PATIENT HEALTH QUESTIONNAIRE - PHQ9
SUM OF ALL RESPONSES TO PHQ QUESTIONS 1-9: 15
CLINICAL INTERPRETATION OF PHQ2 SCORE: 4
5. POOR APPETITE OR OVEREATING: 1 - SEVERAL DAYS

## 2025-05-29 ASSESSMENT — FIBROSIS 4 INDEX: FIB4 SCORE: 1.42

## 2025-05-29 NOTE — PROGRESS NOTES
"[facility] NEUROLOGY  MULTIPLE SCLEROSIS & NEUROIMMUNOLOGY  FOLLOW-UP VISIT    This evaluation was conducted via Teams using secure and encrypted videoconferencing technology. The patient was in their home in the [facility].    The patient's identity was confirmed and verbal consent was obtained for this virtual visit.    DISEASE SUMMARY:  Principal neurologic diagnosis: MS  Diagnosis of MS: 12/7/2024  Disease History:  - 2020: onset of sensory symptoms (numbness, tingling) in the feet, improved but never completely resolved  - 2021: onset of bladder symptoms (incontinence)  - additional episodes of numbness, self-resolved  - 11/29/2024: onset of right monocular visual symptoms (shadow in the left superior quadrant, no pain, no red desaturation), eventually referred to Dr. Luther, vision improved spontaneously  - 4/23/2025: started Kesimpta  Disease course at onset: relapsing  Current disease course: relapsing  Previous disease therapies:  - none  Current disease therapies:  - none  Symptomatic therapies:  - none  CSF:  - never sampled  Other Testing:  -   MRI head:  -   MRI orbits:  - 12/7/2024: \"segmental focal increased T2 signal intensities within the optic nerves... there is subtle enhancement noted in the one of the T2 hyperintensity... these findings likely represent subacute/acute right optic neuritis... multiple abnormal aelxi-ventricular T2 hyper-intensities...\"  MRI cervical spine:  - 2/9/2025: \"no lesions seen...\"  MRI thoracic spine:  - 2/9/2025: \"no lesions seen...\"  Immunizations:  - influenza?:   - Pneumonia?:  - SARS-CoV-2?:   Cancer Screens:  - mammogram:   - PAP?:   - skin check:     CC: MS    INTERVAL HISTORY:  Kathryn Lozano is a 34 y.o. woman with MS.  I last saw her in the clinic on 2/27/2025.  At that time I recommended she start Kesimpta.  Today, she was unaccompanied, and she provided the following interval history:    MS:  I have summarized pertinent interval data above.    A " "review of MS-related symptoms was notable for the following:    Fatigue:   Weakness:   Numbness:   Incoordination:   Spasms/Spasticity:   Vision Impairment:   Walking/Balance Problems:   Neuralgia:   Bowel Symptoms:   Bladder Symptoms:   Heat Sensitivity: she has difficulty regulating her body temperature, when she gets too hot she \"can't think\"  Depression:   Cognitive/Memory Problems:   Sexual Dysfunction:   Anxiety:     There is a lot of pain/stiffness in the morning when she tries to get out of bed.  It takes her a couple of hours to \"stop moaning and groaning\" and to walk normally.    Headache:  The following is a summary of headache symptoms, presented in my standard format:    Family History: none  Age at onset (years): elementary school  Location: retro-orbital, usually unilateral (either side)  Radiation: back of the eye sockets  Frequency: baseline: 3/week, lately: 9 headaches since the last visit (3/month)  Duration: baseline: hours to an entire day, lately: ~3-4 hours  Headache Days/Month: baseline: 9/30, lately: 3/30  Quality: squeezing, throbbing  Intensity: baseline: 5-6/10, lately: 4-5/10  Aura: nausea  Photophobia/Phonophobia/Nausea/Vomiting: yes/yes/yes/yes  Provoked by Physical Activity?:   Triggers: hunger, dehydration  Associated Symptoms:   Autonomic Signs (such as ptosis, miosis, conjunctival injection, rhinorrhea, increased lacrimation): none  Head Trauma:   Association with Menses: no, IUD  ED Visits: none  Hospitalizations: none  Missed Work Days ():   Sleep (hours/night): 6-8  Caffeine Intake: no more than 2/day  Hydration: well hydrated  Nutrition: regular meals  Exercise:   Analgesic Overuse:     Current Medication Regimen:  - propranolol: 20 mg BID  - Ubrelvy: \"that stuff is amazing\"    Medications Tried: Response  Preventive:  -     Rescue:  - sumatriptan: inconsistently effective  - rizatriptan: inconsistently effective    Medications Not Tried:  - tricyclic antidepressants: " contraindicated due to med-med interaction with fluoxetine  - topiramate:     MEDICATIONS:  Current Outpatient Medications   Medication Sig    ADDERALL, 20MG, 20 MG Tab Take 20 mg by mouth 2 times a day.    gabapentin (NEURONTIN) 300 MG Cap Take 1-2 tablets (300-600 mg) nightly as needed for hot flashes.    Ubrogepant 100 MG Tab Take 100 mg at the onset of aura/HA; may re-dose x1 after 2 hrs if HA persists; MDD: 200 mg (Patient taking differently: as needed. Take 100 mg at the onset of aura/HA; may re-dose x1 after 2 hrs if HA persists; MDD: 200 mg)    ondansetron (ZOFRAN) 4 MG Tab tablet Take 1 Tablet by mouth every four hours as needed for Nausea/Vomiting.    propranolol (INDERAL) 20 MG Tab Take 1 Tablet by mouth 2 times a day for 360 days.    Ofatumumab (KESIMPTA) 20 MG/0.4ML Solution Auto-injector Initial: 20 mg once weekly for 3 doses (weeks 0, 1, and 2); maintenance: 20 mg once monthly starting at week 4.    FLUoxetine (PROZAC) 20 MG Cap Take 1 Capsule by mouth every day.    ARIPiprazole (ABILIFY) 5 MG tablet Take 1 Tablet by mouth every day.     MEDICAL, SOCIAL, AND FAMILY HISTORY:  There is no change in the patient's ROS or medical, social, or family histories since the previous visit on 2/27/2025.    REVIEW OF SYSTEMS:  A ROS was completed.  Pertinent positives and negatives were included in the HPI, above.  All other systems were reviewed and are negative.    PHYSICAL EXAM:  General/Medical:  - NAD    Neuro:  MENTAL STATUS: awake and alert; no deficits of speech or language; oriented to conversation; affect was appropriate to situation; pleasant, cooperative    CRANIAL NERVES:    II: acuity: NT, fields: NT, pupils: NT, discs: NT    III/IV/VI: versions: grossly intact    V: facial sensation: NT    VII: facial expression: symmetric    VIII: hearing: intact to voice    IX/X: palate: NT    XI: shoulder shrug: NT    XII: tongue: NT    MOTOR:  - bulk: NT  - tone: NT  Upper Extremity Strength (R/L)    NT    Elbow flexion NT   Elbow extension NT   Shoulder abduction NT     Lower Extremity Strength  (R/L)   Hip flexion NT   Knee extension NT   Knee flexion NT   Ankle dorsiflexion NT   Ankle plantarflexion NT     - heel-walk: NT  - toe-walk: NT  - pronator drift: absent  - abnormal movements: none    SENSATION:  - light touch: NT  - vibration (R/L, seconds): NT at the great toes  - pinprick: NT  - proprioception: NT  - Romberg: NT    COORDINATION:  - finger to nose: NT  - finger tapping: NT    REFLEXES:  Reflex Right Left   BR NT NT   Biceps NT NT   Triceps NT NT   Patellae NT NT   Achilles NT NT   Toes NT NT     GAIT:  - NT    QUANTITATIVE SCORES:  Timed 25-foot walk (sec): NT.  Assistive device: none    REVIEW OF IMAGING STUDIES:  I have summarized interval data above.    REVIEW OF LABORATORY STUDIES:  No additional data since the last visit.    ASSESSMENT:  Kathryn Lozano is a 34 y.o. woman with MS and migraine with aura.  Plans/recommendations as follow:    PLAN:  MS:  - continue Kesimpta  - trial of gabapentin 300-600 mg/night, could escalate the dosage as needed/tolerated    Migraine w/ Aura:  Prevention:  - continue propranolol 20 mg BID  - get 7-9 hours of sleep per night; can try supplementing melatonin 2-10 mg, 2-3 hours before bedtime  - drink plenty of fluids (urine should be nearly clear)  - avoid excessive caffeine intake (no more than 2 servings per day and nothing in the afternoon)  - eat regular meals (don't skip meals)  - get moderate exercise (even just a 20 minute walk daily)    Rescue:  - continue Ubrelvy 100 mg: take this at the onset of aura or headache pain; may re-dose x1 after 2 hours if headache persists  - continue ondansetron 4 mg: take this at the onset of aura or headache to prevent or reduce nausea; may re-dose after 2 hours if headache or nausea persist; do not use more often than 3 days/week  - do not use analgesics (e.g., ibuprofen, acetaminophen) more than 2 days per week  in order to avoid analgesic rebound headaches    - keep a headache log    Depression:  - referral to behavioral health    Follow-Up:  - Return in about 2 months (around 7/29/2025).    Signed: Narciso Katz M.D.

## 2025-06-02 ENCOUNTER — PATIENT MESSAGE (OUTPATIENT)
Dept: NEUROLOGY | Facility: MEDICAL CENTER | Age: 35
End: 2025-06-02
Payer: COMMERCIAL

## 2025-06-02 NOTE — Clinical Note
REFERRAL APPROVAL NOTICE         Sent on June 2, 2025                   Pietro Bir[place]  [address] [facility] [address]                   Dear Ms. Lozano,    After a careful review of the medical information and benefit coverage, [facility] has processed your referral. See below for additional details.    If applicable, you must be actively enrolled with your insurance for coverage of the authorized service. If you have any questions regarding your coverage, please contact your insurance directly.    REFERRAL INFORMATION   Referral #:  [id number]  Referred-To Provider    Referred-By Provider:  Behavioral Health    Narciso Katz M.D.   [facility]      [address] [place] [address] [address]  [facility] [address]  [phone] [id number]  [facility] [address]  [phone]    Referral Start Date:  05/29/2025  Referral End Date:   05/29/2026             SCHEDULING  If you do not already have an appointment, please call [phone] to make an appointment.     MORE INFORMATION  If you do not already have a [employer] account, sign up at: [email or web address]  You can access your medical information, make appointments, see lab results, billing information, and more.  If you have questions regarding this referral, please contact  the [facility] Referrals department at:             [phone]. Monday - Friday 8:00AM - 5:00PM.     Sincerely,    [facility]

## 2025-06-03 ENCOUNTER — PATIENT MESSAGE (OUTPATIENT)
Dept: NEUROLOGY | Facility: MEDICAL CENTER | Age: 35
End: 2025-06-03
Payer: COMMERCIAL

## 2025-06-12 ENCOUNTER — PATIENT MESSAGE (OUTPATIENT)
Dept: NEUROLOGY | Facility: MEDICAL CENTER | Age: 35
End: 2025-06-12
Payer: COMMERCIAL

## 2025-06-13 ENCOUNTER — OFFICE VISIT (OUTPATIENT)
Dept: MEDICAL GROUP | Facility: PHYSICIAN GROUP | Age: 35
End: 2025-06-13
Payer: COMMERCIAL

## 2025-06-13 VITALS
OXYGEN SATURATION: 99 % | HEART RATE: 98 BPM | HEIGHT: 65 IN | SYSTOLIC BLOOD PRESSURE: 126 MMHG | WEIGHT: 174 LBS | BODY MASS INDEX: 28.99 KG/M2 | TEMPERATURE: 97.4 F | DIASTOLIC BLOOD PRESSURE: 60 MMHG

## 2025-06-13 DIAGNOSIS — R41.840 ATTENTION OR CONCENTRATION DEFICIT: Primary | ICD-10-CM

## 2025-06-13 DIAGNOSIS — Z00.00 BLOOD TESTS FOR ROUTINE GENERAL PHYSICAL EXAMINATION: ICD-10-CM

## 2025-06-13 DIAGNOSIS — Z30.431 IUD CHECK UP: ICD-10-CM

## 2025-06-13 DIAGNOSIS — G43.809 OTHER MIGRAINE WITHOUT STATUS MIGRAINOSUS, NOT INTRACTABLE: ICD-10-CM

## 2025-06-13 DIAGNOSIS — R23.2 HOT FLASHES: ICD-10-CM

## 2025-06-13 PROBLEM — F41.0 PANIC ATTACKS: Status: RESOLVED | Noted: 2025-06-13 | Resolved: 2025-06-13

## 2025-06-13 PROBLEM — G43.909 MIGRAINE: Status: ACTIVE | Noted: 2025-06-13

## 2025-06-13 PROCEDURE — 3078F DIAST BP <80 MM HG: CPT | Performed by: NURSE PRACTITIONER

## 2025-06-13 PROCEDURE — 99214 OFFICE O/P EST MOD 30 MIN: CPT | Performed by: NURSE PRACTITIONER

## 2025-06-13 PROCEDURE — 3074F SYST BP LT 130 MM HG: CPT | Performed by: NURSE PRACTITIONER

## 2025-06-13 RX ORDER — FLUOXETINE HYDROCHLORIDE 40 MG/1
40 CAPSULE ORAL
COMMUNITY

## 2025-06-13 ASSESSMENT — FIBROSIS 4 INDEX: FIB4 SCORE: 1.42

## 2025-06-13 ASSESSMENT — ENCOUNTER SYMPTOMS
PALPITATIONS: 0
DIZZINESS: 0
MUSCULOSKELETAL NEGATIVE: 1
INSOMNIA: 0
HEADACHES: 1
SHORTNESS OF BREATH: 0
GASTROINTESTINAL NEGATIVE: 1
FEVER: 0
DEPRESSION: 1
EYES NEGATIVE: 1
NERVOUS/ANXIOUS: 1
WEIGHT LOSS: 0
CHILLS: 0

## 2025-06-13 NOTE — PROGRESS NOTES
Verbal consent was acquired by the patient to use [employer] ambient listening note generation during this visit     CC:  Chief Complaint   Patient presents with    [facility]     MS Kathryn Lozano 34 y.o. female  patient presenting for     History of Present Illness  The patient is a 34-year-old female who presents today to [facility] and has some questions about multiple sclerosis.    Multiple Sclerosis (MS)  - Diagnosed in 01/2025, following an MRI in 12/2024  - Diagnosis prompted by sudden onset of blindness in one eye on Halloween, characterized by missing pieces in her vision  - Consultations with an ophthalmologist and a neuro-ophthalmologist detected no abnormalities in her eyes  - MRI revealed lesions in her brain  - Experiences overheating, excessive sweating, and cognitive difficulties attributed to MS  - Reports severe migraines, depression, and anxiety  - Under the care of Dr. Campuzano and recently started Kesimpta  - Next appointment with Dr. Campuzano scheduled for 08/2025  - Proactive in seeking support from online MS communities and blogs  - Prescribed gabapentin for hot flashes, taken at night    Migraines  - Experiencing severe migraines, improved with Ubrelvy  - Reports daily pain and stiffness upon waking, requiring assistance to get out of bed  - Prescribed propranolol and Ubrelvy for migraines    Depression and Anxiety  - History of depression and anxiety, exacerbated by MS diagnosis  - Currently on Prozac 40 mg for depression and Abilify as a mood stabilizer    ADHD  - History of ADHD  - Currently on Adderall    Complex Grief  - History of complex grief following the deaths of her father, dog, and aunt within six months in 2022    Nausea  - Currently on Zofran for nausea, taken infrequently        Review of Systems   Constitutional:  Positive for malaise/fatigue. Negative for chills, fever and weight loss.   HENT: Negative.     Eyes: Negative.    Respiratory:  Negative  "for shortness of breath.    Cardiovascular:  Negative for chest pain and palpitations.   Gastrointestinal: Negative.    Genitourinary: Negative.    Musculoskeletal: Negative.    Skin: Negative.    Neurological:  Positive for headaches. Negative for dizziness.   Psychiatric/Behavioral:  Positive for depression. Negative for suicidal ideas. The patient is nervous/anxious. The patient does not have insomnia.        /60 (BP Location: Left arm, Patient Position: Sitting, BP Cuff Size: Adult)   Pulse 98   Temp 36.3 °C (97.4 °F)   Ht 1.651 m (5' 5\")   Wt 78.9 kg (174 lb)   SpO2 99% , Body mass index is 28.96 kg/m².    Physical Exam  Constitutional:       Appearance: Normal appearance. She is obese.   HENT:      Head: Normocephalic and atraumatic.   Cardiovascular:      Rate and Rhythm: Normal rate and regular rhythm.      Pulses: Normal pulses.      Heart sounds: Normal heart sounds.   Pulmonary:      Effort: Pulmonary effort is normal.   Musculoskeletal:         General: Normal range of motion.   Skin:     General: Skin is warm and dry.   Neurological:      Mental Status: She is alert and oriented to person, place, and time.   Psychiatric:         Mood and Affect: Mood normal.         Thought Content: Thought content normal.         Judgment: Judgment normal.           Results  Imaging   - MRI of the brain: 12/2024, Lesions on the brain    Assessment and Plan    Assessment & Plan  1. Multiple Sclerosis (MS).  - Diagnosed in January 2025, following an MRI in December 2024 that revealed lesions on her brain.  - Symptoms include vision loss, overheating, and severe migraines.  - Advised to avoid stressors such as extreme temperatures and stress; recommendations include wearing cool clothing, staying hydrated, and seeking shade when outdoors.  - Encouraged to join online MS communities and support groups; referral to OB/GYN for Mirena replacement; fasting blood work ordered to monitor overall health.    2. " Depression.  - Reports feeling super depressed and anxious, exacerbated by MS diagnosis.  - Currently taking Prozac 40 mg daily and Abilify as a mood stabilizer.  - Encouraged to continue current medication regimen and consider joining support groups for additional emotional support.    3. Migraines.  - Experiences severe migraines, which have improved with Ubrelvy.  - Also taking propranolol as part of migraine management plan.  - Reports decreased frequency and severity of migraines with this treatment.    4. Heat Intolerance.  - Reports severe heat intolerance, potentially related to MS.  - Advised to take precautions such as wearing cool clothing, staying hydrated, and using fans or air conditioning.  - Gabapentin prescribed to help with hot flashes.    5. Attention Deficit Hyperactivity Disorder (ADHD).  - Currently taking Adderall for ADHD.  - Should continue current medication regimen.    6. Nausea.  - Currently on Zofran for nausea, taken infrequently.    7. Complex grief.  - History of complex grief following the deaths of her father, dog, and aunt within six months in 2022.     1. Attention or concentration deficit (Primary)  Chronic and stable condition   Continue with adderral by psych    2. Other migraine without status migrainosus, not intractable  Chronic and stable condition   Continue with neurology    3. Hot flashes  Chronic and stable condition   - FREE THYROXINE; Future  - TSH; Future    4. Blood tests for routine general physical examination  - CBC WITHOUT DIFFERENTIAL; Future  - FREE THYROXINE; Future  - HEMOGLOBIN A1C; Future  - Lipid Profile; Future  - VITAMIN D,25 HYDROXY (DEFICIENCY); Future  - TSH; Future  - Comp Metabolic Panel; Future    5. IUD check up  Chronic and stable condition   - Referral to OB/Gyn         Discussed with patient possible alternative diagnoses, patient is to take all medications as prescribed.     If symptoms persist FU w/PCP, if symptoms worsen go to emergency  room.     If experiencing any side effects from prescribed medications reports to the office immediately or go to emergency room.    Reviewed indication, dosage, usage and potential adverse effects of prescribed medications.     Reviewed risks and benefits of treatment plan. Patient verbalizes understanding of all instruction and verbally agrees to plan.    Return for pending labs.    This note was created using voice recognition software ([employer]). The accuracy of the dictation is limited by the abilities of the software. I have reviewed the note prior to signing, however some errors in grammar and context are still possible. If you have any questions related to this note please do not hesitate to contact our office.

## 2025-06-20 NOTE — Clinical Note
REFERRAL APPROVAL NOTICE         Sent on June 19, 2025                   Pietro Nietoer  [address] [facility] [place] [address]                   Dear Ms. Lozano,    After a careful review of the medical information and benefit coverage, [facility] has processed your referral. See below for additional details.    If applicable, you must be actively enrolled with your insurance for coverage of the authorized service. If you have any questions regarding your coverage, please contact your insurance directly.    REFERRAL INFORMATION   Referral #:  [id number]  Referred-To Department    Referred-By Provider:  OB/Gyn    JODEE Nunez   [facility]      [address]  [address]  [facility] [id number] [phone] [address]  [facility] [address] [phone]    Referral Start Date:  06/13/2025  Referral End Date:   06/13/2026             SCHEDULING  If you do not already have an appointment, please call [phone] to make an appointment.     MORE INFORMATION  If you do not already have a [employer] account, sign up at: [email or web address]  You can access your medical information, make appointments, see lab results, billing information, and more.  If you have questions regarding this referral, please contact  the [facility] Referrals department at:             [phone]. Monday - Friday 8:00AM - 5:00PM.     Sincerely,    [facility]

## 2025-06-23 ENCOUNTER — PATIENT MESSAGE (OUTPATIENT)
Dept: NEUROLOGY | Facility: MEDICAL CENTER | Age: 35
End: 2025-06-23
Payer: COMMERCIAL

## 2025-06-26 ENCOUNTER — PATIENT MESSAGE (OUTPATIENT)
Dept: NEUROLOGY | Facility: MEDICAL CENTER | Age: 35
End: 2025-06-26
Payer: COMMERCIAL

## 2025-07-02 ENCOUNTER — OFFICE VISIT (OUTPATIENT)
Dept: NEUROLOGY | Facility: MEDICAL CENTER | Age: 35
End: 2025-07-02
Attending: PSYCHIATRY & NEUROLOGY
Payer: COMMERCIAL

## 2025-07-02 VITALS
SYSTOLIC BLOOD PRESSURE: 116 MMHG | DIASTOLIC BLOOD PRESSURE: 82 MMHG | OXYGEN SATURATION: 96 % | HEART RATE: 112 BPM | HEIGHT: 65 IN | TEMPERATURE: 98.6 F | BODY MASS INDEX: 29.27 KG/M2 | RESPIRATION RATE: 18 BRPM | WEIGHT: 175.71 LBS

## 2025-07-02 DIAGNOSIS — G43.109 MIGRAINE WITH AURA AND WITHOUT STATUS MIGRAINOSUS, NOT INTRACTABLE: Primary | ICD-10-CM

## 2025-07-02 DIAGNOSIS — G35 MULTIPLE SCLEROSIS (HCC): ICD-10-CM

## 2025-07-02 PROCEDURE — 3074F SYST BP LT 130 MM HG: CPT | Performed by: PSYCHIATRY & NEUROLOGY

## 2025-07-02 PROCEDURE — 99214 OFFICE O/P EST MOD 30 MIN: CPT | Performed by: PSYCHIATRY & NEUROLOGY

## 2025-07-02 PROCEDURE — 99213 OFFICE O/P EST LOW 20 MIN: CPT | Performed by: PSYCHIATRY & NEUROLOGY

## 2025-07-02 PROCEDURE — 3079F DIAST BP 80-89 MM HG: CPT | Performed by: PSYCHIATRY & NEUROLOGY

## 2025-07-02 ASSESSMENT — MONTREAL COGNITIVE ASSESSMENT (MOCA)
WHAT IS THE TOTAL SCORE (OUT OF 30): 21
9. REPEAT EACH SENTENCE: 0/2
4. NAME EACH OF THE THREE ANIMALS SHOWN: 3/3
7. [VIGILENCE] TAP WHEN HEARING DESIGNATED LETTER: 1/1
ORIENTATION SUBSCORE: 5/6
1. ALTERNATING TRAIL MAKING: 0/1
WHAT IS THE VERSION OF MOCA ADMINISTERED: 8.1
2. COPY DRAWING: 0/1
DELAYED RECALL SUBSCORE: 2/5
8. SERIAL SUBTRACTION OF 7S: 2 OR 3/5
11. FOR EACH PAIR OF WORDS, WHAT CATEGORY DO THEY BELONG TO (OUT OF 2): 2/2
ADD 1 POINT IF LESS THAN OR EQUAL TO 12 YR EDUCATION LEVEL: 1
3. DRAW A CLOCK: CONTOUR, NUMBERS, HANDS: 3/3
10. [FLUENCY] NAME WORDS STARTING WITH DESIGNATED LETTER: 1/1
6. READ LIST OF DIGITS [FORWARD/BACKWARD]: 1/2

## 2025-07-02 ASSESSMENT — FIBROSIS 4 INDEX: FIB4 SCORE: 1.42

## 2025-07-02 ASSESSMENT — PATIENT HEALTH QUESTIONNAIRE - PHQ9
SUM OF ALL RESPONSES TO PHQ QUESTIONS 1-9: 9
CLINICAL INTERPRETATION OF PHQ2 SCORE: 2
5. POOR APPETITE OR OVEREATING: 0 - NOT AT ALL

## 2025-07-02 NOTE — PROGRESS NOTES
"[facility] NEUROLOGY  MULTIPLE SCLEROSIS & NEUROIMMUNOLOGY  FOLLOW-UP VISIT    DISEASE SUMMARY:  Principal neurologic diagnosis: MS  Diagnosis of MS: 12/7/2024  Disease History:  - 2020: onset of sensory symptoms (numbness, tingling) in the feet, improved but never completely resolved  - 2021: onset of bladder symptoms (incontinence)  - additional episodes of numbness, self-resolved  - 11/29/2024: onset of right monocular visual symptoms (shadow in the left superior quadrant, no pain, no red desaturation), eventually referred to Dr. Luther, vision improved spontaneously  - 4/23/2025: started Kesimpta  Disease course at onset: relapsing  Current disease course: relapsing  Previous disease therapies:  - none  Current disease therapies:  - none  Symptomatic therapies:  - none  CSF:  - never sampled  Other Testing:  -   MRI head:  -   MRI orbits:  - 12/7/2024: \"segmental focal increased T2 signal intensities within the optic nerves... there is subtle enhancement noted in the one of the T2 hyperintensity... these findings likely represent subacute/acute right optic neuritis... multiple abnormal alexi-ventricular T2 hyper-intensities...\"  MRI cervical spine:  - 2/9/2025: \"no lesions seen...\"  MRI thoracic spine:  - 2/9/2025: \"no lesions seen...\"  Immunizations:  - influenza?:   - Pneumonia?:  - SARS-CoV-2?:   Cancer Screens:  - mammogram:   - PAP?:   - skin check:     CC: MS    INTERVAL HISTORY:  Kathryn Lozano is a 34 y.o. woman with MS.  I last saw her via Teams on 5/29/2025.  At that time I recommended she continue Kesimpta.  Today, she was unaccompanied, and she provided the following interval history:    MS:  I have summarized pertinent interval data above.    Since staring Kesimpta there have been no new or worsened neurologic symptoms.    Headache:  The following is a summary of headache symptoms, presented in my standard format:    Family History: none  Age at onset (years): elementary school  Location: " "retro-orbital, usually unilateral (either side)  Radiation: back of the eye sockets  Frequency: baseline: 3/week, lately: 9 headaches since the last visit (3/month)  Duration: baseline: hours to an entire day, lately: ~3-4 hours  Headache Days/Month: baseline: 9/30, lately: 3/30  Quality: squeezing, throbbing  Intensity: baseline: 5-6/10, lately: 4-5/10  Aura: nausea  Photophobia/Phonophobia/Nausea/Vomiting: yes/yes/yes/yes  Provoked by Physical Activity?:   Triggers: hunger, dehydration  Associated Symptoms:   Autonomic Signs (such as ptosis, miosis, conjunctival injection, rhinorrhea, increased lacrimation): none  Head Trauma:   Association with Menses: no, IUD  ED Visits: none  Hospitalizations: none  Missed Work Days ():   Sleep (hours/night): 6-8  Caffeine Intake: no more than 2/day  Hydration: well hydrated  Nutrition: regular meals  Exercise:   Analgesic Overuse:     Current Medication Regimen:  - propranolol: 20 mg BID  - Ubrelvy: \"that stuff is amazing\"    Medications Tried: Response  Preventive:  -     Rescue:  - sumatriptan: inconsistently effective  - rizatriptan: inconsistently effective    Medications Not Tried:  - tricyclic antidepressants: contraindicated due to med-med interaction with fluoxetine  - topiramate:     MEDICATIONS:  Current Outpatient Medications   Medication Sig    fluoxetine (PROZAC) 40 MG capsule Take 40 mg by mouth every day.    ADDERALL, 20MG, 20 MG Tab Take 20 mg by mouth 2 times a day.    gabapentin (NEURONTIN) 300 MG Cap Take 1-2 tablets (300-600 mg) nightly as needed for hot flashes.    Ubrogepant 100 MG Tab Take 100 mg at the onset of aura/HA; may re-dose x1 after 2 hrs if HA persists; MDD: 200 mg    ondansetron (ZOFRAN) 4 MG Tab tablet Take 1 Tablet by mouth every four hours as needed for Nausea/Vomiting.    propranolol (INDERAL) 20 MG Tab Take 1 Tablet by mouth 2 times a day for 360 days.    Ofatumumab (KESIMPTA) 20 MG/0.4ML Solution Auto-injector Initial: 20 mg once weekly " for 3 doses (weeks 0, 1, and 2); maintenance: 20 mg once monthly starting at week 4.    ARIPiprazole (ABILIFY) 5 MG tablet Take 1 Tablet by mouth every day.     MEDICAL, SOCIAL, AND FAMILY HISTORY:  There is no change in the patient's ROS or medical, social, or family histories since the previous visit on 5/29/2025.    REVIEW OF SYSTEMS:  A ROS was completed.  Pertinent positives and negatives were included in the HPI, above.  All other systems were reviewed and are negative.    PHYSICAL EXAM:  General/Medical:  - NAD    Neuro:  MENTAL STATUS: awake and alert; no deficits of speech or language; oriented to conversation; affect was appropriate to situation; pleasant, cooperative    CRANIAL NERVES:    II: acuity: J7/1+-1, fields: intact to finger count, pupils: 3/3 to 2/2 w/o APD, discs: NT    III/IV/VI: versions: intact, pursuits: smooth, no nystagmus, saccades: intact    V: facial sensation: intact to light touch    VII: facial expression: symmetric    VIII: hearing: intact to finger rub    IX/X: palate: elevates symmetrically    XI: shoulder shrug: symmetric    XII: tongue: midline    MOTOR:  - bulk: normal throughout  - tone: normal in the upper extremities  Upper Extremity Strength (R/L)    5/5   Elbow flexion 5/5   Elbow extension 5/5   Shoulder abduction 5/5     Lower Extremity Strength  (R/L)   Hip flexion 4/4   Knee extension 5/5   Knee flexion 4/5   Ankle dorsiflexion NT   Ankle plantarflexion NT     - heel-walk: intact  - toe-walk: intact  - pronator drift: absent  - abnormal movements: none    SENSATION:  - light touch: intact over the upper- and lower extremities, paraesthesias present over the lower extremities, bilaterally  - vibration (R/L, seconds): 11/17 at the great toes  - pinprick: NT  - proprioception: NT  - Romberg: intact    COORDINATION:  - finger to nose: intact, no dysmetria  - finger tapping: rapid and accurate, bilaterally    REFLEXES:  Reflex Right Left   BR 1+ 1+   Biceps 2+ 2+  "  Triceps 1+ 1+   Patellae 1+ 1+   Achilles 1+ 1+   Toes mute mute     GAIT:  - wide-based    Igor Cognitive Assessment (MOCA) Version Number: 8.1   VISUOSPACIAL / EXECUTIVE   Clock Drawing: 3/3  Spatial Drawin/1  Cube Drawin/1    NAMING  Naming: 33    MEMORY  Memory:      ATTENTION  Digits: 1/2  Letters: 1/  Subtraction: 2 or 3/5    LANGUAGE  Repeat Phrases: 0/2  Fluency:  (fuck, fudge, Turkish, face, fowl, fence, flag, flute, flu, flee, flinch, cindy, freezing, frostbite)    ABSTRACTION  Abstraction:     DELAYED RECALL  Recall words:  (missed: Rastafari, marco a, red)  Category Cue (if applicable):    Multiple Choice Cue (if applicable):     ORIENTATION  Orientation:  (\"Thursday\" (actually Wednesday))    Add 1 point if less than or equal to 12 yr education level: 1   MOCA TOTAL SCORE:  21 /30  Biomechanical/Visual Limitations (if applicable):       QUANTITATIVE SCORES:  Timed 25-foot walk (sec): 4.7 on 2025.  Assistive device: none    REVIEW OF IMAGING STUDIES:  No additional data since the last visit.    REVIEW OF LABORATORY STUDIES:  No additional data since the last visit.    ASSESSMENT:  Kathryn Lozano is a 34 y.o. woman with MS and migraine with aura.  Plans/recommendations as follow:    PLAN:  MS:  - continue Kesimpta    Migraine w/ Aura:  Prevention:  - continue propranolol 20 mg BID  - get 7-9 hours of sleep per night; can try supplementing melatonin 2-10 mg, 2-3 hours before bedtime  - drink plenty of fluids (urine should be nearly clear)  - avoid excessive caffeine intake (no more than 2 servings per day and nothing in the afternoon)  - eat regular meals (don't skip meals)  - get moderate exercise (even just a 20 minute walk daily)    Rescue:  - continue Ubrelvy 100 mg: take this at the onset of aura or headache pain; may re-dose x1 after 2 hours if headache persists  - continue ondansetron 4 mg: take this at the onset of aura or headache to prevent or reduce nausea; " may re-dose after 2 hours if headache or nausea persist; do not use more often than 3 days/week  - do not use analgesics (e.g., ibuprofen, acetaminophen) more than 2 days per week in order to avoid analgesic rebound headaches    - keep a headache log    Follow-Up:  - Return in about 6 months (around 1/2/2026).    Signed: Narciso Katz M.D.

## 2025-07-19 ENCOUNTER — HOSPITAL ENCOUNTER (EMERGENCY)
Facility: MEDICAL CENTER | Age: 35
End: 2025-07-19
Attending: EMERGENCY MEDICINE
Payer: COMMERCIAL

## 2025-07-19 ENCOUNTER — APPOINTMENT (OUTPATIENT)
Dept: RADIOLOGY | Facility: MEDICAL CENTER | Age: 35
End: 2025-07-19
Attending: EMERGENCY MEDICINE
Payer: COMMERCIAL

## 2025-07-19 VITALS
DIASTOLIC BLOOD PRESSURE: 79 MMHG | OXYGEN SATURATION: 96 % | BODY MASS INDEX: 28.65 KG/M2 | HEART RATE: 76 BPM | WEIGHT: 171.96 LBS | TEMPERATURE: 96.5 F | RESPIRATION RATE: 18 BRPM | SYSTOLIC BLOOD PRESSURE: 114 MMHG | HEIGHT: 65 IN

## 2025-07-19 DIAGNOSIS — T14.8XXA HEMATOMA: Primary | ICD-10-CM

## 2025-07-19 PROCEDURE — 93971 EXTREMITY STUDY: CPT | Mod: 26,LT | Performed by: INTERNAL MEDICINE

## 2025-07-19 PROCEDURE — 93971 EXTREMITY STUDY: CPT | Mod: LT

## 2025-07-19 PROCEDURE — 99283 EMERGENCY DEPT VISIT LOW MDM: CPT

## 2025-07-19 ASSESSMENT — FIBROSIS 4 INDEX: FIB4 SCORE: 1.46
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
## 2025-07-19 NOTE — DISCHARGE INSTRUCTIONS
1.  You may follow-up with primary care in 1 week for recheck, to make sure the area is healing as expected.    2.  Return to the emergency department if you develop any new or worsening symptoms including worsening pain, swelling, drainage from the area, or any further concerns.  You may continue to have discoloration from the bruising, and it may take a few weeks for the swelling to completely resolve, however you should start to see some improvement in the pain and swelling over the next week.
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
## 2025-08-07 ENCOUNTER — PATIENT MESSAGE (OUTPATIENT)
Dept: NEUROLOGY | Facility: MEDICAL CENTER | Age: 35
End: 2025-08-07
Payer: COMMERCIAL

## 2025-08-27 ENCOUNTER — OFFICE VISIT (OUTPATIENT)
Dept: NEUROLOGY | Facility: MEDICAL CENTER | Age: 35
End: 2025-08-27
Attending: PSYCHIATRY & NEUROLOGY
Payer: COMMERCIAL

## 2025-08-27 VITALS
BODY MASS INDEX: 28.98 KG/M2 | RESPIRATION RATE: 18 BRPM | SYSTOLIC BLOOD PRESSURE: 130 MMHG | DIASTOLIC BLOOD PRESSURE: 74 MMHG | OXYGEN SATURATION: 99 % | HEIGHT: 65 IN | HEART RATE: 112 BPM | WEIGHT: 173.94 LBS | TEMPERATURE: 98.1 F

## 2025-08-27 DIAGNOSIS — G35 MULTIPLE SCLEROSIS (HCC): Primary | ICD-10-CM

## 2025-08-27 DIAGNOSIS — G43.109 MIGRAINE WITH AURA AND WITHOUT STATUS MIGRAINOSUS, NOT INTRACTABLE: ICD-10-CM

## 2025-08-27 DIAGNOSIS — M62.838 MUSCLE SPASTICITY: ICD-10-CM

## 2025-08-27 PROCEDURE — 99214 OFFICE O/P EST MOD 30 MIN: CPT | Performed by: PSYCHIATRY & NEUROLOGY

## 2025-08-27 PROCEDURE — 3078F DIAST BP <80 MM HG: CPT | Performed by: PSYCHIATRY & NEUROLOGY

## 2025-08-27 PROCEDURE — 3075F SYST BP GE 130 - 139MM HG: CPT | Performed by: PSYCHIATRY & NEUROLOGY

## 2025-08-27 RX ORDER — ATOGEPANT 60 MG/1
60 TABLET ORAL DAILY
Qty: 30 TABLET | Refills: 11 | Status: SHIPPED | OUTPATIENT
Start: 2025-08-27 | End: 2026-08-27

## 2025-08-27 RX ORDER — PROPRANOLOL HYDROCHLORIDE 80 MG/1
80 CAPSULE, EXTENDED RELEASE ORAL DAILY
Qty: 30 CAPSULE | Refills: 11 | Status: SHIPPED | OUTPATIENT
Start: 2025-08-27 | End: 2025-08-27

## 2025-08-27 ASSESSMENT — FIBROSIS 4 INDEX: FIB4 SCORE: 1.46

## 2025-08-27 ASSESSMENT — PATIENT HEALTH QUESTIONNAIRE - PHQ9
5. POOR APPETITE OR OVEREATING: 1 - SEVERAL DAYS
CLINICAL INTERPRETATION OF PHQ2 SCORE: 3
SUM OF ALL RESPONSES TO PHQ QUESTIONS 1-9: 11

## 2025-08-28 ENCOUNTER — PATIENT OUTREACH (OUTPATIENT)
Dept: HEALTH INFORMATION MANAGEMENT | Facility: OTHER | Age: 35
End: 2025-08-28
Payer: COMMERCIAL

## 2025-08-28 DIAGNOSIS — G35 MULTIPLE SCLEROSIS (HCC): Primary | ICD-10-CM

## 2025-08-28 DIAGNOSIS — F32.2 SEVERE MAJOR DEPRESSION (HCC): ICD-10-CM

## 2025-08-28 SDOH — ECONOMIC STABILITY: FOOD INSECURITY: WITHIN THE PAST 12 MONTHS, THE FOOD YOU BOUGHT JUST DIDN'T LAST AND YOU DIDN'T HAVE MONEY TO GET MORE.: SOMETIMES TRUE

## 2025-08-28 SDOH — ECONOMIC STABILITY: FOOD INSECURITY: WITHIN THE PAST 12 MONTHS, YOU WORRIED THAT YOUR FOOD WOULD RUN OUT BEFORE YOU GOT MONEY TO BUY MORE.: SOMETIMES TRUE

## 2025-08-28 SDOH — HEALTH STABILITY: PHYSICAL HEALTH: ON AVERAGE, HOW MANY MINUTES DO YOU ENGAGE IN EXERCISE AT THIS LEVEL?: 20 MIN

## 2025-08-28 SDOH — ECONOMIC STABILITY: INCOME INSECURITY: IN THE LAST 12 MONTHS, WAS THERE A TIME WHEN YOU WERE NOT ABLE TO PAY THE MORTGAGE OR RENT ON TIME?: NO

## 2025-08-28 SDOH — HEALTH STABILITY: PHYSICAL HEALTH: ON AVERAGE, HOW MANY DAYS PER WEEK DO YOU ENGAGE IN MODERATE TO STRENUOUS EXERCISE (LIKE A BRISK WALK)?: 3 DAYS

## 2025-08-28 SDOH — ECONOMIC STABILITY: TRANSPORTATION INSECURITY
IN THE PAST 12 MONTHS, HAS LACK OF TRANSPORTATION KEPT YOU FROM MEETINGS, WORK, OR FROM GETTING THINGS NEEDED FOR DAILY LIVING?: NO

## 2025-08-28 SDOH — ECONOMIC STABILITY: TRANSPORTATION INSECURITY
IN THE PAST 12 MONTHS, HAS THE LACK OF TRANSPORTATION KEPT YOU FROM MEDICAL APPOINTMENTS OR FROM GETTING MEDICATIONS?: NO

## 2025-08-28 ASSESSMENT — SOCIAL DETERMINANTS OF HEALTH (SDOH)
WITHIN THE LAST YEAR, HAVE TO BEEN RAPED OR FORCED TO HAVE ANY KIND OF SEXUAL ACTIVITY BY YOUR PARTNER OR EX-PARTNER?: NO
HOW HARD IS IT FOR YOU TO PAY FOR THE VERY BASICS LIKE FOOD, HOUSING, MEDICAL CARE, AND HEATING?: VERY HARD
WITHIN THE LAST YEAR, HAVE YOU BEEN KICKED, HIT, SLAPPED, OR OTHERWISE PHYSICALLY HURT BY YOUR PARTNER OR EX-PARTNER?: NO
DO YOU BELONG TO ANY CLUBS OR ORGANIZATIONS SUCH AS CHURCH GROUPS UNIONS, FRATERNAL OR ATHLETIC GROUPS, OR SCHOOL GROUPS?: NO
HOW OFTEN DO YOU GET TOGETHER WITH FRIENDS OR RELATIVES?: TWICE A WEEK
IN THE PAST 12 MONTHS, HAS THE ELECTRIC, GAS, OIL, OR WATER COMPANY THREATENED TO SHUT OFF SERVICE IN YOUR HOME?: NO
WITHIN THE LAST YEAR, HAVE YOU BEEN HUMILIATED OR EMOTIONALLY ABUSED IN OTHER WAYS BY YOUR PARTNER OR EX-PARTNER?: NO
HOW OFTEN DO YOU ATTENT MEETINGS OF THE CLUB OR ORGANIZATION YOU BELONG TO?: NEVER
HOW OFTEN DO YOU ATTEND CHURCH OR RELIGIOUS SERVICES?: NEVER
IN A TYPICAL WEEK, HOW MANY TIMES DO YOU TALK ON THE PHONE WITH FAMILY, FRIENDS, OR NEIGHBORS?: THREE TIMES A WEEK
WITHIN THE LAST YEAR, HAVE YOU BEEN AFRAID OF YOUR PARTNER OR EX-PARTNER?: NO

## 2025-08-28 ASSESSMENT — LIFESTYLE VARIABLES
HOW MANY STANDARD DRINKS CONTAINING ALCOHOL DO YOU HAVE ON A TYPICAL DAY: PATIENT DOES NOT DRINK
AUDIT-C TOTAL SCORE: 0
HOW OFTEN DO YOU HAVE SIX OR MORE DRINKS ON ONE OCCASION: NEVER
SKIP TO QUESTIONS 9-10: 1
HOW OFTEN DO YOU HAVE A DRINK CONTAINING ALCOHOL: NEVER

## 2025-08-28 ASSESSMENT — PATIENT HEALTH QUESTIONNAIRE - PHQ9
SUM OF ALL RESPONSES TO PHQ QUESTIONS 1-9: 13
5. POOR APPETITE OR OVEREATING: 0 - NOT AT ALL
CLINICAL INTERPRETATION OF PHQ2 SCORE: 6